# Patient Record
Sex: FEMALE | Race: WHITE | NOT HISPANIC OR LATINO | Employment: UNEMPLOYED | URBAN - METROPOLITAN AREA
[De-identification: names, ages, dates, MRNs, and addresses within clinical notes are randomized per-mention and may not be internally consistent; named-entity substitution may affect disease eponyms.]

---

## 2017-11-01 LAB — HEP C VIRUS AB: NEGATIVE

## 2019-02-07 ENCOUNTER — HOSPITAL ENCOUNTER (EMERGENCY)
Facility: HOSPITAL | Age: 39
Discharge: HOME | End: 2019-02-07
Attending: EMERGENCY MEDICINE
Payer: MEDICAID

## 2019-02-07 VITALS
WEIGHT: 293 LBS | RESPIRATION RATE: 22 BRPM | DIASTOLIC BLOOD PRESSURE: 63 MMHG | BODY MASS INDEX: 45.99 KG/M2 | OXYGEN SATURATION: 96 % | SYSTOLIC BLOOD PRESSURE: 189 MMHG | TEMPERATURE: 100 F | HEIGHT: 67 IN | HEART RATE: 113 BPM

## 2019-02-07 DIAGNOSIS — J11.1 INFLUENZA: Primary | ICD-10-CM

## 2019-02-07 LAB
BILIRUB UR QL STRIP: NEGATIVE
CLARITY UR: CLEAR
COLOR UR: YELLOW
GLUCOSE UR QL STRIP: NEGATIVE
HGB UR QL STRIP: NEGATIVE
INFLUENZA A, MOLECULAR: NEGATIVE
INFLUENZA B, MOLECULAR: NEGATIVE
KETONES UR QL STRIP: NEGATIVE
LEUKOCYTE ESTERASE UR QL STRIP: NEGATIVE
NITRITE UR QL STRIP: NEGATIVE
PH UR STRIP: 8 [PH] (ref 5–8)
PROT UR QL STRIP: ABNORMAL
SP GR UR STRIP: 1.02 (ref 1–1.03)
SPECIMEN SOURCE: NORMAL
URN SPEC COLLECT METH UR: ABNORMAL
UROBILINOGEN UR STRIP-ACNC: NEGATIVE EU/DL

## 2019-02-07 PROCEDURE — 99284 EMERGENCY DEPT VISIT MOD MDM: CPT

## 2019-02-07 PROCEDURE — 87502 INFLUENZA DNA AMP PROBE: CPT

## 2019-02-07 PROCEDURE — 81003 URINALYSIS AUTO W/O SCOPE: CPT

## 2019-02-07 RX ORDER — VENLAFAXINE 37.5 MG/1
TABLET ORAL 2 TIMES DAILY
COMMUNITY

## 2019-02-07 RX ORDER — LISINOPRIL 10 MG/1
10 TABLET ORAL DAILY
COMMUNITY

## 2019-02-07 RX ORDER — OSELTAMIVIR PHOSPHATE 75 MG/1
75 CAPSULE ORAL 2 TIMES DAILY
Qty: 10 CAPSULE | Refills: 0 | Status: SHIPPED | OUTPATIENT
Start: 2019-02-07 | End: 2019-02-12

## 2019-02-07 RX ORDER — LEVOTHYROXINE SODIUM 100 UG/1
100 TABLET ORAL DAILY
COMMUNITY

## 2019-02-07 RX ORDER — FUROSEMIDE 20 MG/1
20 TABLET ORAL 2 TIMES DAILY
COMMUNITY

## 2019-02-07 RX ORDER — PREDNISONE 10 MG/1
40 TABLET ORAL DAILY
Qty: 20 TABLET | Refills: 0 | Status: SHIPPED | OUTPATIENT
Start: 2019-02-07 | End: 2019-02-12

## 2019-02-07 RX ORDER — BUDESONIDE AND FORMOTEROL FUMARATE DIHYDRATE 160; 4.5 UG/1; UG/1
2 AEROSOL RESPIRATORY (INHALATION) EVERY 12 HOURS
COMMUNITY

## 2019-02-07 NOTE — ED TRIAGE NOTES
PT REPORTS FEVER, CHILLS, BODY ACHES X2 DAYS, HAS BEEN TAKING TYLENOL FOR FEVER, LAST TAKEN ABOUT 1900 LAST NIGHT. OTHER MEMBERS OF HOUSEHOLD HAVE BEEN SICK AS WELL. PT STATES SHE IS ABOUT 6 MONTHS PREGNANT AT THIS TIME. GILMA LARA

## 2019-02-07 NOTE — DISCHARGE INSTRUCTIONS
Tamiflu 75 mg twice daily for 5 days  Prednisone 40 mg daily for 5 days  Keep OB appointment scheduled day as discussed  Review CDC  information re FLU and Pregnancy

## 2019-02-09 NOTE — ED PROVIDER NOTES
Encounter Date: 2/7/2019       History     Chief Complaint   Patient presents with    Fever    Chills    Cough    Nausea     38 y F with reported pregnancy (pending OB eval today) complains of flu like illness with known exposure in past days     No symptoms pertaining to pregnancy     Stable medical conditions inc asthma           Review of patient's allergies indicates:   Allergen Reactions    Aspirin     Benadryl [diphenhydramine hcl]     Clindamycin     Contrast media      Past Medical History:   Diagnosis Date    Asthma     Diabetes mellitus     Hypertension     Thyroid disease      History reviewed. No pertinent surgical history.  History reviewed. No pertinent family history.  Social History     Tobacco Use    Smoking status: Current Every Day Smoker     Packs/day: 1.00     Types: Cigarettes    Smokeless tobacco: Never Used   Substance Use Topics    Alcohol use: No     Frequency: Never    Drug use: No     Review of Systems   Constitutional: Positive for chills, fatigue and fever. Negative for diaphoresis.   HENT: Positive for congestion and rhinorrhea. Negative for sore throat.    Eyes: Negative for discharge and visual disturbance.   Respiratory: Positive for cough. Negative for choking, chest tightness, shortness of breath, wheezing and stridor.    Cardiovascular: Negative.    Gastrointestinal: Negative.    Genitourinary: Negative for decreased urine volume, difficulty urinating, dysuria, flank pain, hematuria, pelvic pain, vaginal bleeding and vaginal discharge.   Musculoskeletal: Positive for arthralgias and myalgias.   Skin: Negative.    Hematological: Negative.    All other systems reviewed and are negative.      Physical Exam     Initial Vitals   BP Pulse Resp Temp SpO2   02/07/19 0150 02/07/19 0150 02/07/19 0150 02/07/19 0136 02/07/19 0150   (!) 205/87 (!) 114 (!) 22 100 °F (37.8 °C) (!) 92 %      MAP       --                Physical Exam    Nursing note and vitals  reviewed.  Constitutional: She appears well-developed and well-nourished. She is not diaphoretic. No distress.   5-7  158 kg F - no acute distress    HENT:   Head: Normocephalic and atraumatic.   Nose: Nose normal.   Mouth/Throat: Oropharynx is clear and moist and mucous membranes are normal. No oropharyngeal exudate, posterior oropharyngeal edema or posterior oropharyngeal erythema.   Eyes: Conjunctivae and EOM are normal. Pupils are equal, round, and reactive to light. Right eye exhibits no discharge. Left eye exhibits no discharge. No scleral icterus.   Neck: Normal range of motion. Neck supple.   Cardiovascular: Regular rhythm, normal heart sounds and intact distal pulses. Tachycardia present.  Exam reveals no gallop and no friction rub.    No murmur heard.  Pulmonary/Chest: Breath sounds normal. No accessory muscle usage. No tachypnea. No respiratory distress. She has no decreased breath sounds. She has no wheezes. She has no rhonchi. She has no rales.   Abdominal: Soft. Bowel sounds are normal. She exhibits no distension and no mass. There is no tenderness. There is no rebound and no guarding.   Musculoskeletal: Normal range of motion. She exhibits no edema or tenderness.   Lymphadenopathy:     She has no cervical adenopathy.   Neurological: She is alert and oriented to person, place, and time. She has normal strength. No cranial nerve deficit or sensory deficit. GCS score is 15. GCS eye subscore is 4. GCS verbal subscore is 5. GCS motor subscore is 6.   Skin: Skin is warm and dry. Capillary refill takes less than 2 seconds. No rash noted. No erythema. No pallor.   Psychiatric: She has a normal mood and affect. Her behavior is normal. Judgment and thought content normal.         ED Course   Procedures  Labs Reviewed   URINALYSIS, REFLEX TO URINE CULTURE - Abnormal; Notable for the following components:       Result Value    Protein, UA Trace (*)     All other components within normal limits    Narrative:      Preferred Collection Type->Urine, Clean Catch   INFLUENZA A & B BY MOLECULAR         Results for orders placed or performed during the hospital encounter of 02/07/19   Influenza A & B by Molecular   Result Value Ref Range    Influenza A, Molecular Negative Negative    Influenza B, Molecular Negative Negative    Flu A & B Source Nasal Swab    Urinalysis, Reflex to Urine Culture Urine, Clean Catch   Result Value Ref Range    Specimen UA Urine, Clean Catch     Color, UA Yellow Yellow, Straw, Bruna    Appearance, UA Clear Clear    pH, UA 8.0 5.0 - 8.0    Specific Gravity, UA 1.020 1.005 - 1.030    Protein, UA Trace (A) Negative    Glucose, UA Negative Negative    Ketones, UA Negative Negative    Bilirubin (UA) Negative Negative    Occult Blood UA Negative Negative    Nitrite, UA Negative Negative    Urobilinogen, UA Negative Negative EU/dL    Leukocytes, UA Negative Negative         Imaging Results    None          Medical Decision Making:   Clinical Tests:   Lab Tests: Ordered and Reviewed  ED Management:  Influenza likely given exposure   Underlying pulm issues  Reported Pregnancy  No acute complication of Flu A  Discussed importance of compliance with treatment     Tamiflu 75 mg twice daily for 5 days  Prednisone 40 mg daily for 5 days  Keep OB appointment scheduled day as discussed  Review CDC  information re FLU and Pregnancy                       Clinical Impression:   The encounter diagnosis was Influenza.                             Car Disla MD  02/09/19 0901

## 2020-07-13 ENCOUNTER — HOSPITAL ENCOUNTER (EMERGENCY)
Facility: HOSPITAL | Age: 40
Discharge: HOME | End: 2020-07-13
Attending: FAMILY MEDICINE
Payer: MEDICAID

## 2020-07-13 VITALS
TEMPERATURE: 99 F | SYSTOLIC BLOOD PRESSURE: 131 MMHG | OXYGEN SATURATION: 100 % | BODY MASS INDEX: 47.09 KG/M2 | HEART RATE: 90 BPM | DIASTOLIC BLOOD PRESSURE: 58 MMHG | RESPIRATION RATE: 22 BRPM | WEIGHT: 293 LBS | HEIGHT: 66 IN

## 2020-07-13 DIAGNOSIS — M79.604 PAIN OF RIGHT LOWER EXTREMITY: Primary | ICD-10-CM

## 2020-07-13 DIAGNOSIS — E66.01 CLASS 3 SEVERE OBESITY WITH BODY MASS INDEX (BMI) GREATER THAN OR EQUAL TO 70 IN ADULT, UNSPECIFIED OBESITY TYPE, UNSPECIFIED WHETHER SERIOUS COMORBIDITY PRESENT: ICD-10-CM

## 2020-07-13 LAB
D DIMER PPP IA.FEU-MCNC: 0.22 MG/L FEU
SARS-COV-2 RDRP RESP QL NAA+PROBE: NEGATIVE

## 2020-07-13 PROCEDURE — U0002 COVID-19 LAB TEST NON-CDC: HCPCS

## 2020-07-13 PROCEDURE — 99282 EMERGENCY DEPT VISIT SF MDM: CPT | Mod: 25

## 2020-07-13 PROCEDURE — 94640 AIRWAY INHALATION TREATMENT: CPT

## 2020-07-13 PROCEDURE — 94761 N-INVAS EAR/PLS OXIMETRY MLT: CPT

## 2020-07-13 PROCEDURE — 85379 FIBRIN DEGRADATION QUANT: CPT

## 2020-07-13 RX ORDER — IPRATROPIUM BROMIDE AND ALBUTEROL SULFATE 2.5; .5 MG/3ML; MG/3ML
3 SOLUTION RESPIRATORY (INHALATION)
Status: DISCONTINUED | OUTPATIENT
Start: 2020-07-13 | End: 2020-07-13 | Stop reason: HOSPADM

## 2020-07-13 NOTE — ED PROVIDER NOTES
Encounter Date: 7/13/2020       History     Chief Complaint   Patient presents with    Abdominal Pain    Leg Pain     40-year-old female presents to the ED complaining of feeling pain and swelling in the right leg and right thigh she has had problems with the right knee in the past currently she is in an adverse social situation is living in her car after apparently being evicted from local home we just moved here from [place] patient denies any hemoptysis or shortness of breath but does smoke 1/2 pack per day she has history of asthma and diabetes and is open to obtaining primary care locally she has no known coronary artery disease        Review of patient's allergies indicates:   Allergen Reactions    Aspirin     Benadryl [diphenhydramine hcl]     Clindamycin     Contrast media      Past Medical History:   Diagnosis Date    Asthma     Diabetes mellitus     Hypertension     Thyroid disease      No past surgical history on file.  No family history on file.  Social History     Tobacco Use    Smoking status: Current Every Day Smoker     Packs/day: 1.00     Types: Cigarettes    Smokeless tobacco: Never Used   Substance Use Topics    Alcohol use: No     Frequency: Never    Drug use: No     Review of Systems   Constitutional: Negative for fever.   HENT: Negative for congestion and sore throat.    Respiratory: Positive for cough. Negative for apnea, chest tightness and shortness of breath.    Cardiovascular: Positive for chest pain.   Gastrointestinal: Negative for nausea.   Genitourinary: Negative for dysuria.   Musculoskeletal: Positive for gait problem and joint swelling. Negative for back pain, myalgias and neck pain.   Skin: Negative for rash.   Neurological: Negative for weakness and numbness.   Hematological: Does not bruise/bleed easily.       Physical Exam     Initial Vitals [07/13/20 0020]   BP Pulse Resp Temp SpO2   (!) 150/84 103 18 98.9 °F (37.2 °C) (!) 94 %      MAP       --          Physical Exam    Nursing note and vitals reviewed.  Constitutional: She appears well-developed and well-nourished. She is not diaphoretic. No distress.   Patient is morbidly obese   HENT:   Head: Normocephalic and atraumatic.   Right Ear: External ear normal.   Left Ear: External ear normal.   Eyes: Pupils are equal, round, and reactive to light. Right eye exhibits no discharge. Left eye exhibits no discharge.   Neck: No tracheal deviation present. No JVD present.   Cardiovascular: Exam reveals no friction rub.    No murmur heard.  Pulmonary/Chest: No stridor. No respiratory distress. She has no wheezes. She has rales.   Abdominal: Bowel sounds are normal. She exhibits no distension.   Musculoskeletal: Normal range of motion.      Comments: Both left and right legs are somewhat erythematous without polo induration the right calf seems slightly enlarged however there is no Homans sign no evidence of polo cellulitis or joint effusion there is some ligamentous laxity in the right knee no tenderness in the right thigh her hip   Neurological: She is alert.   Skin: Skin is warm.   Psychiatric: She has a normal mood and affect.         ED Course   Procedures  Labs Reviewed   D DIMER, QUANTITATIVE   SARS-COV-2 RNA AMPLIFICATION, QUAL     EKG Readings: (Independently Interpreted)   Initial Reading: No STEMI. Rhythm: Normal Sinus Rhythm. Heart Rate: 94. Ectopy: No Ectopy. Conduction: Normal. ST Segments: Normal ST Segments. T Waves Flipped: V6.       Imaging Results    None                                          Clinical Impression:       ICD-10-CM ICD-9-CM   1. Pain of right lower extremity  M79.604 729.5   2. Class 3 severe obesity with body mass index (BMI) greater than or equal to 70 in adult, unspecified obesity type, unspecified whether serious comorbidity present  E66.01 278.01    Z68.45 V85.45                                Dmitri Barbosa MD  07/13/20 0220

## 2020-07-13 NOTE — ED TRIAGE NOTES
PT REPORTS PAIN IN RIGHT LEG AND RIGHT ABD PROGRESSIVELY WORSENING.  GOING ON FOR 3-4 DAYS.  RIGHT CALF HAS 5-6 MORE CM CIRCUMFERENCE.

## 2020-08-15 ENCOUNTER — HOSPITAL ENCOUNTER (OUTPATIENT)
Facility: HOSPITAL | Age: 40
Discharge: HOME | End: 2020-08-18
Attending: INTERNAL MEDICINE | Admitting: INTERNAL MEDICINE
Payer: MEDICAID

## 2020-08-15 DIAGNOSIS — N30.00 ACUTE CYSTITIS WITHOUT HEMATURIA: ICD-10-CM

## 2020-08-15 DIAGNOSIS — L02.31 ABSCESS OF BUTTOCK, RIGHT: Primary | ICD-10-CM

## 2020-08-15 DIAGNOSIS — Z48.89 ENCOUNTER FOR POSTOPERATIVE CARE: ICD-10-CM

## 2020-08-15 DIAGNOSIS — Z01.810 PREOP CARDIOVASCULAR EXAM: ICD-10-CM

## 2020-08-15 PROCEDURE — 99285 EMERGENCY DEPT VISIT HI MDM: CPT | Mod: 25

## 2020-08-16 ENCOUNTER — ANESTHESIA EVENT (OUTPATIENT)
Dept: SURGERY | Facility: HOSPITAL | Age: 40
End: 2020-08-16
Payer: MEDICAID

## 2020-08-16 ENCOUNTER — ANESTHESIA (OUTPATIENT)
Dept: SURGERY | Facility: HOSPITAL | Age: 40
End: 2020-08-16
Payer: MEDICAID

## 2020-08-16 PROBLEM — Z01.810 PREOP CARDIOVASCULAR EXAM: Status: ACTIVE | Noted: 2020-08-16

## 2020-08-16 PROBLEM — E66.01 MORBID OBESITY: Status: ACTIVE | Noted: 2020-08-16

## 2020-08-16 PROBLEM — L02.31 ABSCESS OF BUTTOCK, RIGHT: Status: ACTIVE | Noted: 2020-08-16

## 2020-08-16 PROBLEM — Z79.4 TYPE 2 DIABETES MELLITUS WITHOUT COMPLICATION, WITH LONG-TERM CURRENT USE OF INSULIN: Status: ACTIVE | Noted: 2020-08-16

## 2020-08-16 PROBLEM — E11.9 TYPE 2 DIABETES MELLITUS WITHOUT COMPLICATION, WITH LONG-TERM CURRENT USE OF INSULIN: Status: ACTIVE | Noted: 2020-08-16

## 2020-08-16 PROBLEM — I10 ESSENTIAL HYPERTENSION: Status: ACTIVE | Noted: 2020-08-16

## 2020-08-16 LAB
ALBUMIN SERPL BCP-MCNC: 3 G/DL (ref 3.5–5.2)
ALBUMIN SERPL BCP-MCNC: 3.3 G/DL (ref 3.5–5.2)
ALP SERPL-CCNC: 107 U/L (ref 55–135)
ALP SERPL-CCNC: 87 U/L (ref 55–135)
ALT SERPL W/O P-5'-P-CCNC: 20 U/L (ref 10–44)
ALT SERPL W/O P-5'-P-CCNC: 21 U/L (ref 10–44)
ANION GAP SERPL CALC-SCNC: 13 MMOL/L (ref 8–16)
ANION GAP SERPL CALC-SCNC: 14 MMOL/L (ref 8–16)
AST SERPL-CCNC: 20 U/L (ref 10–40)
AST SERPL-CCNC: 21 U/L (ref 10–40)
B-HCG UR QL: NEGATIVE
BACTERIA #/AREA URNS HPF: ABNORMAL /HPF
BASOPHILS # BLD AUTO: 0.08 K/UL (ref 0–0.2)
BASOPHILS # BLD AUTO: 0.1 K/UL (ref 0–0.2)
BASOPHILS NFR BLD: 0.7 % (ref 0–1.9)
BASOPHILS NFR BLD: 0.9 % (ref 0–1.9)
BILIRUB SERPL-MCNC: 0.5 MG/DL (ref 0.1–1)
BILIRUB SERPL-MCNC: 0.9 MG/DL (ref 0.1–1)
BILIRUB UR QL STRIP: NEGATIVE
BUN SERPL-MCNC: 10 MG/DL (ref 6–20)
BUN SERPL-MCNC: 9 MG/DL (ref 6–20)
CALCIUM SERPL-MCNC: 8.8 MG/DL (ref 8.7–10.5)
CALCIUM SERPL-MCNC: 8.9 MG/DL (ref 8.7–10.5)
CHLORIDE SERPL-SCNC: 95 MMOL/L (ref 95–110)
CHLORIDE SERPL-SCNC: 96 MMOL/L (ref 95–110)
CLARITY UR: CLEAR
CO2 SERPL-SCNC: 23 MMOL/L (ref 23–29)
CO2 SERPL-SCNC: 24 MMOL/L (ref 23–29)
COLOR UR: ABNORMAL
CREAT SERPL-MCNC: 0.7 MG/DL (ref 0.5–1.4)
CREAT SERPL-MCNC: 0.7 MG/DL (ref 0.5–1.4)
DIFFERENTIAL METHOD: ABNORMAL
DIFFERENTIAL METHOD: ABNORMAL
EOSINOPHIL # BLD AUTO: 0.3 K/UL (ref 0–0.5)
EOSINOPHIL # BLD AUTO: 0.4 K/UL (ref 0–0.5)
EOSINOPHIL NFR BLD: 2.7 % (ref 0–8)
EOSINOPHIL NFR BLD: 2.9 % (ref 0–8)
ERYTHROCYTE [DISTWIDTH] IN BLOOD BY AUTOMATED COUNT: 13.8 % (ref 11.5–14.5)
ERYTHROCYTE [DISTWIDTH] IN BLOOD BY AUTOMATED COUNT: 14.1 % (ref 11.5–14.5)
EST. GFR  (AFRICAN AMERICAN): >60 ML/MIN/1.73 M^2
EST. GFR  (AFRICAN AMERICAN): >60 ML/MIN/1.73 M^2
EST. GFR  (NON AFRICAN AMERICAN): >60 ML/MIN/1.73 M^2
EST. GFR  (NON AFRICAN AMERICAN): >60 ML/MIN/1.73 M^2
GLUCOSE SERPL-MCNC: 301 MG/DL (ref 70–110)
GLUCOSE SERPL-MCNC: 332 MG/DL (ref 70–110)
GLUCOSE UR QL STRIP: ABNORMAL
HCT VFR BLD AUTO: 41.3 % (ref 37–48.5)
HCT VFR BLD AUTO: 42.9 % (ref 37–48.5)
HGB BLD-MCNC: 12.6 G/DL (ref 12–16)
HGB BLD-MCNC: 13.4 G/DL (ref 12–16)
HGB UR QL STRIP: ABNORMAL
IMM GRANULOCYTES # BLD AUTO: 0.08 K/UL (ref 0–0.04)
IMM GRANULOCYTES # BLD AUTO: 0.09 K/UL (ref 0–0.04)
IMM GRANULOCYTES NFR BLD AUTO: 0.7 % (ref 0–0.5)
IMM GRANULOCYTES NFR BLD AUTO: 0.8 % (ref 0–0.5)
INR PPP: 1 (ref 0.8–1.2)
KETONES UR QL STRIP: ABNORMAL
LACTATE SERPL-SCNC: 1.9 MMOL/L (ref 0.5–2.2)
LEUKOCYTE ESTERASE UR QL STRIP: NEGATIVE
LYMPHOCYTES # BLD AUTO: 2.3 K/UL (ref 1–4.8)
LYMPHOCYTES # BLD AUTO: 2.6 K/UL (ref 1–4.8)
LYMPHOCYTES NFR BLD: 19.8 % (ref 18–48)
LYMPHOCYTES NFR BLD: 21.3 % (ref 18–48)
MAGNESIUM SERPL-MCNC: 1.9 MG/DL (ref 1.6–2.6)
MCH RBC QN AUTO: 26.7 PG (ref 27–31)
MCH RBC QN AUTO: 27 PG (ref 27–31)
MCHC RBC AUTO-ENTMCNC: 30.5 G/DL (ref 32–36)
MCHC RBC AUTO-ENTMCNC: 31.2 G/DL (ref 32–36)
MCV RBC AUTO: 87 FL (ref 82–98)
MCV RBC AUTO: 88 FL (ref 82–98)
MICROSCOPIC COMMENT: ABNORMAL
MONOCYTES # BLD AUTO: 0.9 K/UL (ref 0.3–1)
MONOCYTES # BLD AUTO: 1 K/UL (ref 0.3–1)
MONOCYTES NFR BLD: 8.1 % (ref 4–15)
MONOCYTES NFR BLD: 8.2 % (ref 4–15)
NEUTROPHILS # BLD AUTO: 7.9 K/UL (ref 1.8–7.7)
NEUTROPHILS # BLD AUTO: 8.1 K/UL (ref 1.8–7.7)
NEUTROPHILS NFR BLD: 66.2 % (ref 38–73)
NEUTROPHILS NFR BLD: 67.7 % (ref 38–73)
NITRITE UR QL STRIP: POSITIVE
NRBC BLD-RTO: 0 /100 WBC
NRBC BLD-RTO: 0 /100 WBC
PH UR STRIP: 6 [PH] (ref 5–8)
PHOSPHATE SERPL-MCNC: 3.4 MG/DL (ref 2.7–4.5)
PLATELET # BLD AUTO: 300 K/UL (ref 150–350)
PLATELET # BLD AUTO: 320 K/UL (ref 150–350)
PMV BLD AUTO: 11.1 FL (ref 9.2–12.9)
PMV BLD AUTO: 11.8 FL (ref 9.2–12.9)
POCT GLUCOSE: 258 MG/DL (ref 70–110)
POCT GLUCOSE: 295 MG/DL (ref 70–110)
POTASSIUM SERPL-SCNC: 4.1 MMOL/L (ref 3.5–5.1)
POTASSIUM SERPL-SCNC: 4.1 MMOL/L (ref 3.5–5.1)
PROT SERPL-MCNC: 7.2 G/DL (ref 6–8.4)
PROT SERPL-MCNC: 7.8 G/DL (ref 6–8.4)
PROT UR QL STRIP: ABNORMAL
PROTHROMBIN TIME: 10.3 SEC (ref 9–12.5)
RBC # BLD AUTO: 4.72 M/UL (ref 4–5.4)
RBC # BLD AUTO: 4.96 M/UL (ref 4–5.4)
RBC #/AREA URNS HPF: 6 /HPF (ref 0–4)
SARS-COV-2 RDRP RESP QL NAA+PROBE: NEGATIVE
SODIUM SERPL-SCNC: 132 MMOL/L (ref 136–145)
SODIUM SERPL-SCNC: 133 MMOL/L (ref 136–145)
SP GR UR STRIP: 1.02 (ref 1–1.03)
SQUAMOUS #/AREA URNS HPF: 8 /HPF
URN SPEC COLLECT METH UR: ABNORMAL
UROBILINOGEN UR STRIP-ACNC: NEGATIVE EU/DL
WBC # BLD AUTO: 11.64 K/UL (ref 3.9–12.7)
WBC # BLD AUTO: 12.21 K/UL (ref 3.9–12.7)
WBC #/AREA URNS HPF: >100 /HPF (ref 0–5)
YEAST URNS QL MICRO: ABNORMAL

## 2020-08-16 PROCEDURE — 00300 ANES ALL PX INTEG H/N/PTRUNK: CPT | Performed by: SURGERY

## 2020-08-16 PROCEDURE — 87088 URINE BACTERIA CULTURE: CPT

## 2020-08-16 PROCEDURE — G0378 HOSPITAL OBSERVATION PER HR: HCPCS

## 2020-08-16 PROCEDURE — 81000 URINALYSIS NONAUTO W/SCOPE: CPT

## 2020-08-16 PROCEDURE — 87186 SC STD MICRODIL/AGAR DIL: CPT | Mod: 59

## 2020-08-16 PROCEDURE — 99219 PR INITIAL OBSERVATION CARE,LEVL II: CPT | Mod: ,,, | Performed by: FAMILY MEDICINE

## 2020-08-16 PROCEDURE — 27000221 HC OXYGEN, UP TO 24 HOURS

## 2020-08-16 PROCEDURE — 81025 URINE PREGNANCY TEST: CPT

## 2020-08-16 PROCEDURE — U0002 COVID-19 LAB TEST NON-CDC: HCPCS

## 2020-08-16 PROCEDURE — 85025 COMPLETE CBC W/AUTO DIFF WBC: CPT

## 2020-08-16 PROCEDURE — 63600175 PHARM REV CODE 636 W HCPCS: Performed by: SURGERY

## 2020-08-16 PROCEDURE — 25000003 PHARM REV CODE 250: Performed by: SURGERY

## 2020-08-16 PROCEDURE — 96372 THER/PROPH/DIAG INJ SC/IM: CPT | Mod: 59

## 2020-08-16 PROCEDURE — 99219 PR INITIAL OBSERVATION CARE,LEVL II: ICD-10-PCS | Mod: ,,, | Performed by: FAMILY MEDICINE

## 2020-08-16 PROCEDURE — 37000009 HC ANESTHESIA EA ADD 15 MINS: Performed by: SURGERY

## 2020-08-16 PROCEDURE — 87070 CULTURE OTHR SPECIMN AEROBIC: CPT | Mod: 59

## 2020-08-16 PROCEDURE — 96361 HYDRATE IV INFUSION ADD-ON: CPT

## 2020-08-16 PROCEDURE — 88304 TISSUE EXAM BY PATHOLOGIST: CPT | Performed by: PATHOLOGY

## 2020-08-16 PROCEDURE — 25000242 PHARM REV CODE 250 ALT 637 W/ HCPCS: Performed by: NURSE ANESTHETIST, CERTIFIED REGISTERED

## 2020-08-16 PROCEDURE — C9399 UNCLASSIFIED DRUGS OR BIOLOG: HCPCS | Performed by: HOSPITALIST

## 2020-08-16 PROCEDURE — D9220A PRA ANESTHESIA: Mod: CRNA,,, | Performed by: NURSE ANESTHETIST, CERTIFIED REGISTERED

## 2020-08-16 PROCEDURE — 36000706: Performed by: SURGERY

## 2020-08-16 PROCEDURE — D9220A PRA ANESTHESIA: Mod: ANES,,, | Performed by: ANESTHESIOLOGY

## 2020-08-16 PROCEDURE — D9220A PRA ANESTHESIA: ICD-10-PCS | Mod: CRNA,,, | Performed by: NURSE ANESTHETIST, CERTIFIED REGISTERED

## 2020-08-16 PROCEDURE — 88304 PR  SURG PATH,LEVEL III: ICD-10-PCS | Mod: 26,,, | Performed by: PATHOLOGY

## 2020-08-16 PROCEDURE — 63600175 PHARM REV CODE 636 W HCPCS: Performed by: HOSPITALIST

## 2020-08-16 PROCEDURE — 87040 BLOOD CULTURE FOR BACTERIA: CPT | Mod: 59

## 2020-08-16 PROCEDURE — 25000003 PHARM REV CODE 250: Performed by: NURSE ANESTHETIST, CERTIFIED REGISTERED

## 2020-08-16 PROCEDURE — 87086 URINE CULTURE/COLONY COUNT: CPT

## 2020-08-16 PROCEDURE — 25000003 PHARM REV CODE 250

## 2020-08-16 PROCEDURE — 87077 CULTURE AEROBIC IDENTIFY: CPT | Mod: 59

## 2020-08-16 PROCEDURE — 88304 TISSUE EXAM BY PATHOLOGIST: CPT | Mod: 26,,, | Performed by: PATHOLOGY

## 2020-08-16 PROCEDURE — 63600175 PHARM REV CODE 636 W HCPCS: Performed by: NURSE ANESTHETIST, CERTIFIED REGISTERED

## 2020-08-16 PROCEDURE — 36000707: Performed by: SURGERY

## 2020-08-16 PROCEDURE — 93010 ELECTROCARDIOGRAM REPORT: CPT | Mod: ,,, | Performed by: INTERNAL MEDICINE

## 2020-08-16 PROCEDURE — 93005 ELECTROCARDIOGRAM TRACING: CPT | Mod: 59

## 2020-08-16 PROCEDURE — 99205 OFFICE O/P NEW HI 60 MIN: CPT | Mod: 25,,, | Performed by: SURGERY

## 2020-08-16 PROCEDURE — 87075 CULTR BACTERIA EXCEPT BLOOD: CPT

## 2020-08-16 PROCEDURE — 99205 PR OFFICE/OUTPT VISIT, NEW, LEVL V, 60-74 MIN: ICD-10-PCS | Mod: 25,,, | Performed by: SURGERY

## 2020-08-16 PROCEDURE — 63600175 PHARM REV CODE 636 W HCPCS

## 2020-08-16 PROCEDURE — 96376 TX/PRO/DX INJ SAME DRUG ADON: CPT

## 2020-08-16 PROCEDURE — C9399 UNCLASSIFIED DRUGS OR BIOLOG: HCPCS | Performed by: SURGERY

## 2020-08-16 PROCEDURE — D9220A PRA ANESTHESIA: ICD-10-PCS | Mod: ANES,,, | Performed by: ANESTHESIOLOGY

## 2020-08-16 PROCEDURE — 71000033 HC RECOVERY, INTIAL HOUR: Performed by: SURGERY

## 2020-08-16 PROCEDURE — 25000003 PHARM REV CODE 250: Performed by: FAMILY MEDICINE

## 2020-08-16 PROCEDURE — 83605 ASSAY OF LACTIC ACID: CPT

## 2020-08-16 PROCEDURE — 87040 BLOOD CULTURE FOR BACTERIA: CPT

## 2020-08-16 PROCEDURE — 85610 PROTHROMBIN TIME: CPT

## 2020-08-16 PROCEDURE — 80053 COMPREHEN METABOLIC PANEL: CPT | Mod: 91

## 2020-08-16 PROCEDURE — 10061 I&D ABSCESS COMP/MULTIPLE: CPT | Mod: ,,, | Performed by: SURGERY

## 2020-08-16 PROCEDURE — 83735 ASSAY OF MAGNESIUM: CPT

## 2020-08-16 PROCEDURE — 36415 COLL VENOUS BLD VENIPUNCTURE: CPT

## 2020-08-16 PROCEDURE — 94761 N-INVAS EAR/PLS OXIMETRY MLT: CPT

## 2020-08-16 PROCEDURE — 37000008 HC ANESTHESIA 1ST 15 MINUTES: Performed by: SURGERY

## 2020-08-16 PROCEDURE — 96374 THER/PROPH/DIAG INJ IV PUSH: CPT | Mod: 59

## 2020-08-16 PROCEDURE — 84100 ASSAY OF PHOSPHORUS: CPT

## 2020-08-16 PROCEDURE — 80053 COMPREHEN METABOLIC PANEL: CPT

## 2020-08-16 PROCEDURE — 10061 PR DRAIN SKIN ABSCESS COMPLIC: ICD-10-PCS | Mod: ,,, | Performed by: SURGERY

## 2020-08-16 PROCEDURE — 93010 EKG 12-LEAD: ICD-10-PCS | Mod: ,,, | Performed by: INTERNAL MEDICINE

## 2020-08-16 PROCEDURE — 25000003 PHARM REV CODE 250: Performed by: HOSPITALIST

## 2020-08-16 RX ORDER — ALBUTEROL SULFATE 90 UG/1
AEROSOL, METERED RESPIRATORY (INHALATION)
Status: DISCONTINUED | OUTPATIENT
Start: 2020-08-16 | End: 2020-08-16

## 2020-08-16 RX ORDER — SUCCINYLCHOLINE CHLORIDE 20 MG/ML
INJECTION INTRAMUSCULAR; INTRAVENOUS
Status: DISCONTINUED | OUTPATIENT
Start: 2020-08-16 | End: 2020-08-16

## 2020-08-16 RX ORDER — MEPERIDINE HYDROCHLORIDE 50 MG/ML
INJECTION INTRAMUSCULAR; INTRAVENOUS; SUBCUTANEOUS
Status: DISCONTINUED | OUTPATIENT
Start: 2020-08-16 | End: 2020-08-16

## 2020-08-16 RX ORDER — FAMOTIDINE 10 MG/ML
20 INJECTION INTRAVENOUS ONCE
Status: DISCONTINUED | OUTPATIENT
Start: 2020-08-16 | End: 2020-08-18 | Stop reason: HOSPADM

## 2020-08-16 RX ORDER — IBUPROFEN 200 MG
24 TABLET ORAL
Status: DISCONTINUED | OUTPATIENT
Start: 2020-08-16 | End: 2020-08-18 | Stop reason: HOSPADM

## 2020-08-16 RX ORDER — VANCOMYCIN 2 GRAM/500 ML IN 0.9 % SODIUM CHLORIDE INTRAVENOUS
2000
Status: DISCONTINUED | OUTPATIENT
Start: 2020-08-16 | End: 2020-08-18 | Stop reason: HOSPADM

## 2020-08-16 RX ORDER — OXYCODONE HYDROCHLORIDE 5 MG/1
5 TABLET ORAL EVERY 6 HOURS PRN
Status: DISCONTINUED | OUTPATIENT
Start: 2020-08-16 | End: 2020-08-16

## 2020-08-16 RX ORDER — SODIUM CHLORIDE, SODIUM LACTATE, POTASSIUM CHLORIDE, CALCIUM CHLORIDE 600; 310; 30; 20 MG/100ML; MG/100ML; MG/100ML; MG/100ML
INJECTION, SOLUTION INTRAVENOUS CONTINUOUS PRN
Status: DISCONTINUED | OUTPATIENT
Start: 2020-08-16 | End: 2020-08-16

## 2020-08-16 RX ORDER — SODIUM CHLORIDE, SODIUM LACTATE, POTASSIUM CHLORIDE, CALCIUM CHLORIDE 600; 310; 30; 20 MG/100ML; MG/100ML; MG/100ML; MG/100ML
125 INJECTION, SOLUTION INTRAVENOUS CONTINUOUS
Status: DISCONTINUED | OUTPATIENT
Start: 2020-08-16 | End: 2020-08-16

## 2020-08-16 RX ORDER — OXYCODONE AND ACETAMINOPHEN 5; 325 MG/1; MG/1
1 TABLET ORAL
Status: DISCONTINUED | OUTPATIENT
Start: 2020-08-16 | End: 2020-08-18 | Stop reason: HOSPADM

## 2020-08-16 RX ORDER — IBUPROFEN 200 MG
16 TABLET ORAL
Status: DISCONTINUED | OUTPATIENT
Start: 2020-08-16 | End: 2020-08-18 | Stop reason: HOSPADM

## 2020-08-16 RX ORDER — LEVOTHYROXINE SODIUM 25 UG/1
100 TABLET ORAL
Status: DISCONTINUED | OUTPATIENT
Start: 2020-08-16 | End: 2020-08-18 | Stop reason: HOSPADM

## 2020-08-16 RX ORDER — FAMOTIDINE 10 MG/ML
INJECTION INTRAVENOUS
Status: DISPENSED
Start: 2020-08-16 | End: 2020-08-17

## 2020-08-16 RX ORDER — BUPIVACAINE HYDROCHLORIDE AND EPINEPHRINE 5; 5 MG/ML; UG/ML
INJECTION, SOLUTION EPIDURAL; INTRACAUDAL; PERINEURAL
Status: DISCONTINUED | OUTPATIENT
Start: 2020-08-16 | End: 2020-08-16 | Stop reason: HOSPADM

## 2020-08-16 RX ORDER — SODIUM CHLORIDE, SODIUM LACTATE, POTASSIUM CHLORIDE, CALCIUM CHLORIDE 600; 310; 30; 20 MG/100ML; MG/100ML; MG/100ML; MG/100ML
INJECTION, SOLUTION INTRAVENOUS CONTINUOUS
Status: DISCONTINUED | OUTPATIENT
Start: 2020-08-16 | End: 2020-08-16

## 2020-08-16 RX ORDER — EPINEPHRINE 0.3 MG/.3ML
INJECTION SUBCUTANEOUS
Status: DISPENSED
Start: 2020-08-16 | End: 2020-08-16

## 2020-08-16 RX ORDER — ACETAMINOPHEN 325 MG/1
650 TABLET ORAL EVERY 4 HOURS PRN
Status: DISCONTINUED | OUTPATIENT
Start: 2020-08-16 | End: 2020-08-18 | Stop reason: HOSPADM

## 2020-08-16 RX ORDER — PROPOFOL 10 MG/ML
VIAL (ML) INTRAVENOUS
Status: DISCONTINUED | OUTPATIENT
Start: 2020-08-16 | End: 2020-08-16

## 2020-08-16 RX ORDER — ONDANSETRON 2 MG/ML
4 INJECTION INTRAMUSCULAR; INTRAVENOUS EVERY 8 HOURS PRN
Status: DISCONTINUED | OUTPATIENT
Start: 2020-08-16 | End: 2020-08-18 | Stop reason: HOSPADM

## 2020-08-16 RX ORDER — SODIUM CHLORIDE 9 MG/ML
INJECTION, SOLUTION INTRAVENOUS CONTINUOUS
Status: DISCONTINUED | OUTPATIENT
Start: 2020-08-16 | End: 2020-08-17

## 2020-08-16 RX ORDER — INSULIN ASPART 100 [IU]/ML
INJECTION, SOLUTION INTRAVENOUS; SUBCUTANEOUS
COMMUNITY

## 2020-08-16 RX ORDER — ROCURONIUM BROMIDE 10 MG/ML
INJECTION, SOLUTION INTRAVENOUS
Status: DISCONTINUED | OUTPATIENT
Start: 2020-08-16 | End: 2020-08-16

## 2020-08-16 RX ORDER — ONDANSETRON 2 MG/ML
4 INJECTION INTRAMUSCULAR; INTRAVENOUS DAILY PRN
Status: DISCONTINUED | OUTPATIENT
Start: 2020-08-16 | End: 2020-08-16

## 2020-08-16 RX ORDER — VANCOMYCIN HCL IN 5 % DEXTROSE 1G/250ML
PLASTIC BAG, INJECTION (ML) INTRAVENOUS
Status: COMPLETED
Start: 2020-08-16 | End: 2020-08-16

## 2020-08-16 RX ORDER — HYDROXYZINE PAMOATE 25 MG/1
25 CAPSULE ORAL EVERY 8 HOURS PRN
Status: DISCONTINUED | OUTPATIENT
Start: 2020-08-16 | End: 2020-08-18 | Stop reason: HOSPADM

## 2020-08-16 RX ORDER — MORPHINE SULFATE 4 MG/ML
INJECTION, SOLUTION INTRAMUSCULAR; INTRAVENOUS
Status: COMPLETED
Start: 2020-08-16 | End: 2020-08-16

## 2020-08-16 RX ORDER — GLUCAGON 1 MG
1 KIT INJECTION
Status: DISCONTINUED | OUTPATIENT
Start: 2020-08-16 | End: 2020-08-18 | Stop reason: HOSPADM

## 2020-08-16 RX ORDER — IPRATROPIUM BROMIDE AND ALBUTEROL SULFATE 2.5; .5 MG/3ML; MG/3ML
3 SOLUTION RESPIRATORY (INHALATION) EVERY 4 HOURS PRN
Status: DISCONTINUED | OUTPATIENT
Start: 2020-08-16 | End: 2020-08-18 | Stop reason: HOSPADM

## 2020-08-16 RX ORDER — METHYLPREDNISOLONE SOD SUCC 125 MG
VIAL (EA) INJECTION
Status: DISPENSED
Start: 2020-08-16 | End: 2020-08-16

## 2020-08-16 RX ORDER — VENLAFAXINE 37.5 MG/1
37.5 TABLET ORAL 2 TIMES DAILY
Status: DISCONTINUED | OUTPATIENT
Start: 2020-08-16 | End: 2020-08-16

## 2020-08-16 RX ORDER — MORPHINE SULFATE 10 MG/ML
2 INJECTION INTRAMUSCULAR; INTRAVENOUS; SUBCUTANEOUS EVERY 4 HOURS PRN
Status: DISCONTINUED | OUTPATIENT
Start: 2020-08-16 | End: 2020-08-16 | Stop reason: RX

## 2020-08-16 RX ORDER — INSULIN ASPART 100 [IU]/ML
1-10 INJECTION, SOLUTION INTRAVENOUS; SUBCUTANEOUS
Status: DISCONTINUED | OUTPATIENT
Start: 2020-08-16 | End: 2020-08-18 | Stop reason: HOSPADM

## 2020-08-16 RX ORDER — HYDROXYZINE HYDROCHLORIDE 25 MG/1
TABLET, FILM COATED ORAL
Status: DISCONTINUED
Start: 2020-08-16 | End: 2020-08-16 | Stop reason: WASHOUT

## 2020-08-16 RX ORDER — LIDOCAINE HYDROCHLORIDE 10 MG/ML
1 INJECTION, SOLUTION EPIDURAL; INFILTRATION; INTRACAUDAL; PERINEURAL ONCE
Status: DISCONTINUED | OUTPATIENT
Start: 2020-08-16 | End: 2020-08-18 | Stop reason: HOSPADM

## 2020-08-16 RX ORDER — MORPHINE SULFATE 4 MG/ML
2 INJECTION, SOLUTION INTRAMUSCULAR; INTRAVENOUS EVERY 4 HOURS PRN
Status: DISCONTINUED | OUTPATIENT
Start: 2020-08-16 | End: 2020-08-18 | Stop reason: HOSPADM

## 2020-08-16 RX ORDER — SODIUM CHLORIDE 0.9 % (FLUSH) 0.9 %
10 SYRINGE (ML) INJECTION
Status: DISCONTINUED | OUTPATIENT
Start: 2020-08-16 | End: 2020-08-18 | Stop reason: HOSPADM

## 2020-08-16 RX ORDER — FLUTICASONE FUROATE AND VILANTEROL 100; 25 UG/1; UG/1
1 POWDER RESPIRATORY (INHALATION) DAILY
Status: DISCONTINUED | OUTPATIENT
Start: 2020-08-16 | End: 2020-08-18 | Stop reason: HOSPADM

## 2020-08-16 RX ORDER — MORPHINE SULFATE 4 MG/ML
2 INJECTION, SOLUTION INTRAMUSCULAR; INTRAVENOUS EVERY 5 MIN PRN
Status: DISCONTINUED | OUTPATIENT
Start: 2020-08-16 | End: 2020-08-16

## 2020-08-16 RX ORDER — LORAZEPAM 0.5 MG/1
0.5 TABLET ORAL EVERY 6 HOURS PRN
Status: DISCONTINUED | OUTPATIENT
Start: 2020-08-16 | End: 2020-08-17

## 2020-08-16 RX ORDER — LISINOPRIL 10 MG/1
10 TABLET ORAL DAILY
Status: DISCONTINUED | OUTPATIENT
Start: 2020-08-16 | End: 2020-08-18 | Stop reason: HOSPADM

## 2020-08-16 RX ORDER — MIDAZOLAM HYDROCHLORIDE 1 MG/ML
INJECTION, SOLUTION INTRAMUSCULAR; INTRAVENOUS
Status: DISCONTINUED | OUTPATIENT
Start: 2020-08-16 | End: 2020-08-16

## 2020-08-16 RX ORDER — METHYLPREDNISOLONE SOD SUCC 125 MG
125 VIAL (EA) INJECTION EVERY 6 HOURS
Status: DISCONTINUED | OUTPATIENT
Start: 2020-08-16 | End: 2020-08-18

## 2020-08-16 RX ADMIN — VANCOMYCIN HYDROCHLORIDE 2000 MG: 1 INJECTION, POWDER, LYOPHILIZED, FOR SOLUTION INTRAVENOUS at 09:08

## 2020-08-16 RX ADMIN — OXYCODONE HYDROCHLORIDE 5 MG: 5 TABLET ORAL at 06:08

## 2020-08-16 RX ADMIN — ALBUTEROL SULFATE 2 PUFF: 90 AEROSOL, METERED RESPIRATORY (INHALATION) at 01:08

## 2020-08-16 RX ADMIN — LEVOTHYROXINE SODIUM 100 MCG: 25 TABLET ORAL at 06:08

## 2020-08-16 RX ADMIN — INSULIN DETEMIR 15 UNITS: 100 INJECTION, SOLUTION SUBCUTANEOUS at 09:08

## 2020-08-16 RX ADMIN — SUCCINYLCHOLINE CHLORIDE 150 MG: 20 INJECTION, SOLUTION INTRAMUSCULAR; INTRAVENOUS at 12:08

## 2020-08-16 RX ADMIN — PROPOFOL 200 MG: 10 INJECTION, EMULSION INTRAVENOUS at 12:08

## 2020-08-16 RX ADMIN — LISINOPRIL 10 MG: 10 TABLET ORAL at 09:08

## 2020-08-16 RX ADMIN — VANCOMYCIN HYDROCHLORIDE 500 MG: 500 INJECTION, POWDER, LYOPHILIZED, FOR SOLUTION INTRAVENOUS at 04:08

## 2020-08-16 RX ADMIN — SODIUM CHLORIDE: 0.9 INJECTION, SOLUTION INTRAVENOUS at 02:08

## 2020-08-16 RX ADMIN — PIPERACILLIN AND TAZOBACTAM 4.5 G: 4; .5 INJECTION, POWDER, FOR SOLUTION INTRAVENOUS at 08:08

## 2020-08-16 RX ADMIN — ROCURONIUM BROMIDE 25 MG: 10 INJECTION, SOLUTION INTRAVENOUS at 12:08

## 2020-08-16 RX ADMIN — INSULIN ASPART 6 UNITS: 100 INJECTION, SOLUTION INTRAVENOUS; SUBCUTANEOUS at 06:08

## 2020-08-16 RX ADMIN — PIPERACILLIN AND TAZOBACTAM 4.5 G: 4; .5 INJECTION, POWDER, FOR SOLUTION INTRAVENOUS at 03:08

## 2020-08-16 RX ADMIN — SODIUM CHLORIDE, POTASSIUM CHLORIDE, SODIUM LACTATE AND CALCIUM CHLORIDE: 600; 310; 30; 20 INJECTION, SOLUTION INTRAVENOUS at 12:08

## 2020-08-16 RX ADMIN — MIDAZOLAM HYDROCHLORIDE 2 MG: 1 INJECTION, SOLUTION INTRAMUSCULAR; INTRAVENOUS at 12:08

## 2020-08-16 RX ADMIN — ONDANSETRON HYDROCHLORIDE 4 MG: 2 SOLUTION INTRAMUSCULAR; INTRAVENOUS at 12:08

## 2020-08-16 RX ADMIN — LORAZEPAM 0.5 MG: 0.5 TABLET ORAL at 09:08

## 2020-08-16 RX ADMIN — Medication 25 MG: at 01:08

## 2020-08-16 RX ADMIN — OXYCODONE HYDROCHLORIDE AND ACETAMINOPHEN 1 TABLET: 5; 325 TABLET ORAL at 09:08

## 2020-08-16 RX ADMIN — OXYCODONE HYDROCHLORIDE AND ACETAMINOPHEN 1 TABLET: 5; 325 TABLET ORAL at 05:08

## 2020-08-16 RX ADMIN — SUGAMMADEX 200 MG: 100 INJECTION, SOLUTION INTRAVENOUS at 01:08

## 2020-08-16 RX ADMIN — Medication 25 MG: at 12:08

## 2020-08-16 RX ADMIN — SODIUM CHLORIDE: 0.9 INJECTION, SOLUTION INTRAVENOUS at 03:08

## 2020-08-16 RX ADMIN — DEXTROSE MONOHYDRATE 1 G: 50 INJECTION, SOLUTION INTRAVENOUS at 05:08

## 2020-08-16 RX ADMIN — PIPERACILLIN AND TAZOBACTAM 4.5 G: 4; .5 INJECTION, POWDER, FOR SOLUTION INTRAVENOUS at 11:08

## 2020-08-16 NOTE — HPI
History of Present Illness:  Patient is a 40 y.o. female who has a past medical history of Asthma, Diabetes mellitus, Hypertension, and Thyroid disease presented with increasing pain and drainage from her right buttock abscess. Patient stated it started off small and she was able to pop it and express drainage from it. It improved some then it started bleeding and becoming bigger in size. She now presented to ED with abscess to her right buttock that is actively draining. ED unable to perform I&D at bedside. Work up showed WBC count of 12K and normal lactic acid. CT scan of pelvis is currently pending. Patient currently denies pain but is in tears because she is unable to see her .

## 2020-08-16 NOTE — OP NOTE
[facility] - Med Surg  General Surgery  Op Note  08/16/2020      Patient Name: Serenity Ba  MRN: [id number]         SURGEON:  Burak Cevallos M.D.     ASSISTANT: None     PREOPERATIVE DIAGNOSIS:  Right buttock abscess     POSTOPERATIVE DIAGNOSIS:  Same     SURGICAL PROCEDURE PERFORMED:  Incision and drainage of right buttock abscess.  Debridement of necrotic tissue.    ANESTHESIA:  General endotracheal.     INDICATIONS FOR PROCEDURE:  Large abscess medial right buttock    SURGICAL FINDINGS:  10 cm abscess with necrotic skin and subcutaneous tissue.  No tracking, loculations, extensions, etc.     PROCEDURE IN DETAIL:  In preop holding, Serenity Ba was identified by name, wrist band, and birth date.  She confirmed identity.  Informed consent process reviewed. She verbalized consent as well as understanding of all treatment options, risks, benefits, details of, and indications for each option.  Operative site was marked.  She confirmed the correct site was identified and marked.  Intravenous antibiotics administered.  Transported to operating room. Time-out completed. Transferred to OR bed. Anesthesia induced without difficulty or complications. Operative field prepped and draped in the usual sterile fashion with ChloraPrep, sterile towels, and sterile drapes.  ChloraPrep was permitted to dry for 3 min before placing towels and drapes.  0.5% Marcaine with epinephrine was infiltrated the operative field with a total of 30 cc was used.  Cruciate configured incision was made overlying the abscess with a 15 blade.  Hemopurulent exudate was encountered under pressure.  Exudate was cultured aerobically and anaerobically.  Abscess was examined digitally.  The incision was extended in all directions to reach the limits the cavity.  Wound was irrigated.  Necrotic skin and subcutaneous tissue was sharply debrided with the scalpel.  Area of debridement measured 4 x 4 cm.  Wound was explored.  No tracking,  loculations, extensions, residual exudate, residual necrotic tissue, etc noted.  Wound was irrigated.  Hemostasis was ensured.  Wound was packed with saline soaked gauze.  Sterile dressing applied.  Emerged from her anesthetic without difficulty and transported to the recovery room in good condition.    TOLERATED: Well    COUNTS:  Correct  on multiple occasions    DRAINS: None     ESTIMATED BLOOD LOSS:  Less than 5 cc     SPECIMEN:  Cultures.  Necrotic tissue.     COMPLICATIONS: None     DISPOSITION:  To PACU, stable.    RECOMMENDATIONS/PLAN:  Daily packing changes with saline soaked gauze as outlined in postoperative orders until wound heals by secondary intention.  Follow-up cultures when available.  Adjust antibiotics if warranted.  Further recommendations and management will depend upon clinical course.      Documented with M*Modal voice recognition software.      Burak Cevallos MD FACS

## 2020-08-16 NOTE — NURSING
Patient called out and upon entry into room, noticed that patient had a BM in the bed.  Patient was assisted into bathroom,  at bedside.  Linens changed and soiled dressing to right buttock removed and new dry dressing of gauze, ABD pads, and perforated tape applied.  Packing was still in place and not visibly soiled.  Patient was then assisted to couch, currently eating dinner.  NAD noted.

## 2020-08-16 NOTE — ASSESSMENT & PLAN NOTE
Lab Results   Component Value Date    WBC 11.64 08/16/2020     CT scan pending  Lactic acid normal   Start IV Vanc and Zosyn  Draw blood and wound cultures  Consult general surgery    08/16/2020  CT scan showing abscess  General surgery has taken patient to the OR for I&D of abscess today.  Appreciate their recommendations in this case  Will continue IV antibiotics  Follow-up cultures  Monitor daily CBC and BMP  Monitor for fever

## 2020-08-16 NOTE — H&P
[facility] - ED Hospital Medicine  History & Physical    Patient Name: Serenity Ba  MRN: [id number]  Admission Date: 8/15/2020  Attending Physician: Jack Hill MD   Primary Care Provider: Primary Doctor No         Patient information was obtained from patient and ER records.       Subjective:     Principal Problem:Abscess of buttock, right    Chief Complaint:   Chief Complaint   Patient presents with    Abscess        HPI: History of Present Illness:  Patient is a 40 y.o. female who has a past medical history of Asthma, Diabetes mellitus, Hypertension, and Thyroid disease presented with increasing pain and drainage from her right buttock abscess. Patient stated it started off small and she was able to pop it and express drainage from it. It improved some then it started bleeding and becoming bigger in size. She now presented to ED with abscess to her right buttock that is actively draining. ED unable to perform I&D at bedside. Work up showed WBC count of 12K and normal lactic acid. CT scan of pelvis is currently pending. Patient currently denies pain but is in tears because she is unable to see her .     Past Medical History:   Diagnosis Date    Asthma     Diabetes mellitus     Hypertension     Thyroid disease        History reviewed. No pertinent surgical history.    Review of patient's allergies indicates:   Allergen Reactions    Aspirin     Benadryl [diphenhydramine hcl]     Clindamycin     Contrast media        No current facility-administered medications on file prior to encounter.      Current Outpatient Medications on File Prior to Encounter   Medication Sig    budesonide-formoterol 160-4.5 mcg (SYMBICORT) 160-4.5 mcg/actuation HFAA Inhale 2 puffs into the lungs every 12 (twelve) hours. Controller    furosemide (LASIX) 20 MG tablet Take 20 mg by mouth 2 (two) times daily.    insulin aspart U-100 (NOVOLOG) 100 unit/mL injection Inject into the skin 3 (three)  times daily before meals.    levothyroxine (SYNTHROID) 100 MCG tablet Take 100 mcg by mouth once daily.    lisinopril 10 MG tablet Take 10 mg by mouth once daily.    venlafaxine (EFFEXOR) 37.5 MG Tab Take by mouth 2 (two) times daily.     Family History     None        Tobacco Use    Smoking status: Current Every Day Smoker     Packs/day: 1.00     Types: Cigarettes    Smokeless tobacco: Never Used   Substance and Sexual Activity    Alcohol use: No     Frequency: Never    Drug use: No    Sexual activity: Not on file     Review of Systems   Constitutional: Negative for activity change, appetite change, chills, fatigue and fever.   HENT: Negative.    Respiratory: Negative.    Cardiovascular: Negative.    Gastrointestinal: Positive for abdominal distention.   Genitourinary: Negative.    Skin: Positive for wound.   Neurological: Negative.    Hematological: Negative.    Psychiatric/Behavioral: Negative.      Objective:     Vital Signs (Most Recent):  Temp: 98.2 °F (36.8 °C) (08/16/20 0005)  Pulse: 94 (08/16/20 0005)  Resp: 18 (08/16/20 0005)  BP: (!) 131/48 (08/16/20 0005)  SpO2: (!) 94 % (08/16/20 0005) Vital Signs (24h Range):  Temp:  [98.2 °F (36.8 °C)] 98.2 °F (36.8 °C)  Pulse:  [94] 94  Resp:  [18] 18  SpO2:  [94 %] 94 %  BP: (131)/(48) 131/48     Weight: (!) 193.2 kg (426 lb)  Body mass index is 68.76 kg/m².    Physical Exam  Constitutional:       General: She is not in acute distress.     Appearance: Normal appearance. She is morbidly obese. She is not ill-appearing.   HENT:      Head: Normocephalic and atraumatic.   Cardiovascular:      Rate and Rhythm: Normal rate.   Pulmonary:      Effort: No respiratory distress.   Skin:     Findings: Abscess present.             Comments: + erythema and induration of right buttock   Dressing in place with serosanguineous drainage    Neurological:      General: No focal deficit present.      Mental Status: She is alert and oriented to person, place, and time.    Psychiatric:         Mood and Affect: Mood normal.         Behavior: Behavior normal.         Thought Content: Thought content normal.         Judgment: Judgment normal.             Significant Labs: All pertinent labs within the past 24 hours have been reviewed.    Significant Imaging: I have reviewed all pertinent imaging results/findings within the past 24 hours.    Assessment/Plan:     * Abscess of buttock, right  Lab Results   Component Value Date    WBC 12.21 08/16/2020     CT scan pending  Lactic acid normal   Start IV Vanc and Zosyn  Draw blood and wound cultures  Consult general surgery    Essential hypertension  BP Readings from Last 3 Encounters:   08/16/20 (!) 159/76   07/13/20 (!) 131/58   02/07/19 (!) 189/63     Restart home BP meds    Type 2 diabetes mellitus without complication, with long-term current use of insulin  Patient takes 25U 70/30 TID at home  Will convert to 15U Detemir BID  Hold prandial due to NPO  Low dose correction scale   Cont blood glucose monitoring   ADA diet      VTE Risk Mitigation (From admission, onward)         Ordered     Place ABI hose  Until discontinued      08/16/20 0125     IP VTE HIGH RISK PATIENT  Once      08/16/20 0125     Place sequential compression device  Until discontinued      08/16/20 0125                 The attending portion of this evaluation, treatment, and documentation was performed per Td Melissa MD via audiovisual.      Td Melissa MD  Department of Hospital Medicine   [facility] - ED

## 2020-08-16 NOTE — NURSING
"Pt complaining of severe itching. MD notified, and epi-pen, solumedrol, and hydroxyzine ordered stat. Before administering meds, pt stated, "Wait, I wasn't really itching or having an allergic reaction. I was just trying to see if that would help let my  stay with me overnight." Pt denies SOB, redness, or hives, and refuses to take any of the ordered medications. Will continue to monitor.  "
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
## 2020-08-16 NOTE — CONSULTS
[facility] - Med Surg  General Surgery  Consult Note  08/16/2020    Patient Name: Serenity Ba  MRN: [id number]  Admission Date: 8/15/2020        REASON FOR CONSULT:  Buttock abscess    HISTORY:  Ms. Ba presented the emergency room early this morning with a worsening right buttock abscess.  She had a small pustule in the medial right buttock that she popped approximately 1 week ago.  Since that time she has noted increasing redness, swelling, drainage, and pain from the site.  No fever or chills.  No nausea or vomiting.  No other associated symptoms.  She now has intolerable pain with copious foul-smelling purulent drainage.    PAST MEDICAL HISTORY:  Asthma.  Hypertension.  Hypothyroidism.  Diabetes.  Morbid obesity.    ALLERGIES:  Aspirin.  Benadryl.  Clindamycin.  Iodine containing contrast.    HOME MEDICATIONS:  Symbicort.  Lasix.  Insulin.  Synthroid.  Lisinopril.  Effexor.    HOSPITAL MEDICATIONS:  Symbicort.  Insulin.  Levothyroxine.  Lisinopril.  Methylprednisolone.  Zosyn.  Vancomycin.    SOCIAL HISTORY:  Active smoker.  Twenty-five pack-year history.  Nondrinker.  No history of alcohol dependence or withdrawal.  No history any illegal or recreational drug use.  No history of alcohol or drug treatment.    FAMILY HISTORY:  Noncontributory.  No family history of any anesthetic complications, bleeding disorders, immune deficiencies, autoimmune disorders    ROS:  As above otherwise all 14 systems entirely negative    Physical Exam:   Gen.-normotensive, normocardic, comfortable, no acute distress, GCS 15.  BMI 69  HEENT-normocephalic, atraumatic, sclerae are anicteric, pupils equal round reactive to light, conjunctiva clear, nares patent without discharge, dentition fair, no oral lesions, oropharynx clear without exudates, mucous membranes moist.   Neck-nontender, full range of motion, no JVD, no bruits, no masses, no thyromegaly.   Cardiovascular-regular rate and rhythm, PMI nondisplaced,  tones normal, no gallops, no rubs, no murmurs.  Intact distal pulses throughout.  No peripheral edema.  No bruits or thrills.  Good perfusion.   Pulmonary-clear to auscultation, no respiratory distress, no wheezes, no rales, no rhonchi, good full symmetrical excursion, no accessory muscle use, no retractions.   Abdomen-soft, nontender, nondistended, normoactive normopitched bowel sounds, no masses, no ventral hernias, no inguinal hernias, no bruits, no hepatosplenomegaly, no guarding, no rebound.  Negative heel jolt, psoas, obturator, and Quiñones signs.  No percussion, referred, or CVA tenderness.  -normal external genitalia, no lesions, no discharge, no masses.  Rectal-no masses, normal sphincter tone, nontender, Hemoccult negative.    Extremities/Musculoskeletal-no clubbing, no cyanosis, no edema, no gross deformities.  Integument-no rashes, no lesions, no jaundice, no induration, no decubiti.  Large indurated, erythematous, and fluctuant mass in the medial right buttock near the intergluteal crease.  Exam limited by body habitus and discomfort    Lymphatic-no adenopathy - cervical, supraclavicular, axillary, or inguinal.    Psych-mental status intact, no hallucinations, no suicidal ideations, no homicidal ideations, alert, appropriate, oriented to person, time, and place.  Neuro-no gross cranial nerve deficits, no gross motor deficits, no evident sensory deficits, no incoordination, no tremors    LAB RESULTS:  No leukocytosis, anemia, or thrombocytopenia.  PT/INR shows no suggestion of anticoagulation therapy oral coagulopathy.  Mild hyponatremia.  All other electrolytes are in the range of normal.  BUN and creatinine good.  Hyperglycemia, moderate.  No evidence of hepatocellular disease or biliary obstruction.  Pregnancy test negative.  COVID-19 rapid screening negative.  No evidence of lactic acidosis.  Urinalysis suggestive of UTI, culture pending.  Blood and wound cultures pending.    RADIOLOGY RESULTS:  CT  scan of the pelvis without contrast films and report reviewed.  Study was limited by obesity.  Inflammatory process is present in the medial right buttock consistent with a phlegmon or abscess.  Process appears limited to the subcutaneous tissue of the buttock and does not appear to involve the muscle, anus, rectum, vulvar, etc.    CARDIOVASCULAR STUDIES:  EKG reviewed from today.  Twelve lead tracing showed normal sinus rhythm with no evidence of any ischemic changes.    ASSESSMENT:  Large right buttock abscess.  Comorbid issues including diabetes, obesity, hypothyroidism, hypertension, asthma, and probable Pickwickian syndrome.    MEDICAL DECISION MAKING:  Surgical drainage of the abscess and debridement of necrotic tissue warranted.  Options at this time include were not limited to transfer to tertiary care center, second opinion, medical management with antibiotics.    COUNSELING:  Ms. Ba was counseled regarding the results of her evaluation thus far.  Recommendations for surgical drainage and debridement in the operating room under general anesthetic were expressed and explained.  Risks of surgery discussed included not limited to death, bleeding, infections, scars, chronic pain, nerve damage, recurrent abscess, need for additional operations or procedures.  Risks of declining surgery discussed included were not limited to sepsis, death, endocarditis, necrotizing fasciitis, organ failure, etc.  Details of recommended procedure discussed included were not limited to general anesthetic, cruciate configured incision overlying the abscess, debridement of necrotic tissue, packing wound open.  Postoperative care would involve daily packing changes until the wound heals by secondary intention.  Questions were solicited and answered.  Entire conversation was held in layman's terms.  At the conclusion of the conversation she voiced understanding of all we discussed, satisfaction all questions were answered, and  stated that she felt fully informed completely capable of making an informed decision.  She desires to proceed with surgical drainage and debridement of the abscess in the operating room under general anesthetic today.    RECOMMENDATIONS:  Continue intravenous antibiotics and current care.  Control diabetes.  Abscess I&D today.  Follow-up cultures when available.  Adjust antibiotics if warranted. Further recommendations will depend upon clinical course.      PLAN:  Proceed with I and D today.  Postoperative daily wound care with packing changes until wound heals by secondary intention.  Further management will depend upon clinical course.    Total face-to-face encounter time was 60 minutes, 40 minutes spent counseling as outlined/summarized above.        Burak Cevallos MD  8/16/2020

## 2020-08-16 NOTE — TRANSFER OF CARE
"Anesthesia Transfer of Care Note    Patient: Serenity Ba    Procedure(s) Performed: Procedure(s) (LRB):  INCISION AND DRAINAGE, ABSCESS (Right)    Patient location: PACU    Anesthesia Type: general    Transport from OR: Transported from OR on room air with adequate spontaneous ventilation    Post pain: adequate analgesia    Post assessment: no apparent anesthetic complications    Post vital signs: stable    Level of consciousness: sedated and responds to stimulation    Nausea/Vomiting: no nausea/vomiting    Complications: none    Transfer of care protocol was followed      Last vitals:   Visit Vitals  /63 (BP Location: Left arm, Patient Position: Sitting)   Pulse 87   Temp 36.3 °C (97.3 °F) (Oral)   Resp 18   Ht 5' 6" (1.676 m)   Wt (!) 193.2 kg (426 lb)   LMP 07/16/2020   SpO2 95%   Breastfeeding No   BMI 68.76 kg/m²     "
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
## 2020-08-16 NOTE — SUBJECTIVE & OBJECTIVE
Past Medical History:   Diagnosis Date    Asthma     Diabetes mellitus     Hypertension     Thyroid disease        History reviewed. No pertinent surgical history.    Review of patient's allergies indicates:   Allergen Reactions    Aspirin     Benadryl [diphenhydramine hcl]     Clindamycin     Contrast media        No current facility-administered medications on file prior to encounter.      Current Outpatient Medications on File Prior to Encounter   Medication Sig    budesonide-formoterol 160-4.5 mcg (SYMBICORT) 160-4.5 mcg/actuation HFAA Inhale 2 puffs into the lungs every 12 (twelve) hours. Controller    furosemide (LASIX) 20 MG tablet Take 20 mg by mouth 2 (two) times daily.    insulin aspart U-100 (NOVOLOG) 100 unit/mL injection Inject into the skin 3 (three) times daily before meals.    levothyroxine (SYNTHROID) 100 MCG tablet Take 100 mcg by mouth once daily.    lisinopril 10 MG tablet Take 10 mg by mouth once daily.    venlafaxine (EFFEXOR) 37.5 MG Tab Take by mouth 2 (two) times daily.     Family History     None        Tobacco Use    Smoking status: Current Every Day Smoker     Packs/day: 1.00     Types: Cigarettes    Smokeless tobacco: Never Used   Substance and Sexual Activity    Alcohol use: No     Frequency: Never    Drug use: No    Sexual activity: Not on file     Review of Systems   Constitutional: Negative for activity change, appetite change, chills, fatigue and fever.   HENT: Negative.    Respiratory: Negative.    Cardiovascular: Negative.    Gastrointestinal: Positive for abdominal distention.   Genitourinary: Negative.    Skin: Positive for wound.   Neurological: Negative.    Hematological: Negative.    Psychiatric/Behavioral: Negative.      Objective:     Vital Signs (Most Recent):  Temp: 98.2 °F (36.8 °C) (08/16/20 0005)  Pulse: 94 (08/16/20 0005)  Resp: 18 (08/16/20 0005)  BP: (!) 131/48 (08/16/20 0005)  SpO2: (!) 94 % (08/16/20 0005) Vital Signs (24h Range):  Temp:  [98.2  °F (36.8 °C)] 98.2 °F (36.8 °C)  Pulse:  [94] 94  Resp:  [18] 18  SpO2:  [94 %] 94 %  BP: (131)/(48) 131/48     Weight: (!) 193.2 kg (426 lb)  Body mass index is 68.76 kg/m².    Physical Exam  Constitutional:       General: She is not in acute distress.     Appearance: Normal appearance. She is morbidly obese. She is not ill-appearing.   HENT:      Head: Normocephalic and atraumatic.   Cardiovascular:      Rate and Rhythm: Normal rate.   Pulmonary:      Effort: No respiratory distress.   Skin:     Findings: Abscess present.             Comments: + erythema and induration of right buttock   Dressing in place with serosanguineous drainage    Neurological:      General: No focal deficit present.      Mental Status: She is alert and oriented to person, place, and time.   Psychiatric:         Mood and Affect: Mood normal.         Behavior: Behavior normal.         Thought Content: Thought content normal.         Judgment: Judgment normal.             Significant Labs: All pertinent labs within the past 24 hours have been reviewed.    Significant Imaging: I have reviewed all pertinent imaging results/findings within the past 24 hours.

## 2020-08-16 NOTE — PLAN OF CARE
Problem:  Fall Injury Risk  Goal: Absence of Fall, Infant Drop and Related Injury  Outcome: Ongoing, Progressing     Problem: Adult Inpatient Plan of Care  Goal: Plan of Care Review  Outcome: Ongoing, Progressing  Goal: Patient-Specific Goal (Individualization)  Outcome: Ongoing, Progressing  Goal: Absence of Hospital-Acquired Illness or Injury  Outcome: Ongoing, Progressing  Goal: Optimal Comfort and Wellbeing  Outcome: Ongoing, Progressing  Goal: Readiness for Transition of Care  Outcome: Ongoing, Progressing  Goal: Rounds/Family Conference  Outcome: Ongoing, Progressing     Problem: Bariatric Environmental Safety  Goal: Safety Maintained with Care  Outcome: Ongoing, Progressing

## 2020-08-16 NOTE — ANESTHESIA POSTPROCEDURE EVALUATION
Anesthesia Post Evaluation    Patient: Serenity Ba    Procedure(s) Performed: Procedure(s) (LRB):  INCISION AND DRAINAGE, ABSCESS (Right)    Final Anesthesia Type: general    Patient location during evaluation: PACU  Patient participation: Yes- Able to Participate  Level of consciousness: awake and alert  Post-procedure vital signs: reviewed and stable  Pain management: adequate  Airway patency: patent    PONV status at discharge: No PONV  Anesthetic complications: no      Cardiovascular status: blood pressure returned to baseline  Respiratory status: unassisted  Hydration status: euvolemic  Follow-up not needed.          Vitals Value Taken Time   /64 08/16/20 1419   Temp 35.8 °C (96.5 °F) 08/16/20 1419   Pulse 83 08/16/20 1419   Resp 18 08/16/20 1419   SpO2 94 % 08/16/20 1419         Event Time   Out of Recovery 08/16/2020 14:21:08         Pain/Mabel Score: Pain Rating Prior to Med Admin: 3 (8/16/2020  9:56 AM)  Mabel Score: 9 (8/16/2020  2:17 PM)

## 2020-08-16 NOTE — HOSPITAL COURSE
08/16/2020  Patient admitted and started on IV antibiotics.  General surgery evaluated the patient and recommended I&D of the buttock abscess.  Patient also with anxiety and panic attacks so started on Ativan 0.5 mg p.r.n. as well as Percocet p.r.n. for pain.  Patient has not been able to take her Effexor in over a month and was only able to get it infrequently prior to this so will not continue it.  Will need to discuss alternative medications.    08/17/2020  Continue IV antibiotics.  Patient is now s/p I&D of buttock abscess.  Ativan increase to 1 mg to help with anxiety which is working.  Pain is well controlled.  Patient doing well overall.  Anticipate discharge once cultures have resulted.

## 2020-08-16 NOTE — ASSESSMENT & PLAN NOTE
Patient takes 25U 70/30 TID at home  Will convert to 15U Detemir BID  Hold prandial due to NPO  Low dose correction scale   Cont blood glucose monitoring   ADA diet

## 2020-08-16 NOTE — PLAN OF CARE
PATIENT AWAKE AND ALERT. ASKING AND ANSWERING QUESTIONS. ON 1L OF OXYGEN. IV INTACT AND INFUSING. VSS. CRNA ASSESSED PATIENT AND WAS OKAY RELEASING PATIENT TO THE FLOOR. REPORT CALLED. PATIENT TAKEN TO THE FLOOR VIA HOSPITAL BED. NURSE AT BEDSIDE.

## 2020-08-16 NOTE — ASSESSMENT & PLAN NOTE
BP Readings from Last 3 Encounters:   08/16/20 (!) 159/76   07/13/20 (!) 131/58   02/07/19 (!) 189/63     Restart home BP meds

## 2020-08-16 NOTE — ED PROVIDER NOTES
Encounter Date: 8/15/2020       History     Chief Complaint   Patient presents with    Abscess     HPI  Review of patient's allergies indicates:   Allergen Reactions    Aspirin     Benadryl [diphenhydramine hcl]     Clindamycin     Contrast media      Past Medical History:   Diagnosis Date    Asthma     Diabetes mellitus     Hypertension     Thyroid disease      History reviewed. No pertinent surgical history.  History reviewed. No pertinent family history.  Social History     Tobacco Use    Smoking status: Current Every Day Smoker     Packs/day: 1.00     Types: Cigarettes    Smokeless tobacco: Never Used   Substance Use Topics    Alcohol use: No     Frequency: Never    Drug use: No     Review of Systems    Physical Exam     Initial Vitals [08/16/20 0005]   BP Pulse Resp Temp SpO2   (!) 131/48 94 18 98.2 °F (36.8 °C) (!) 94 %      MAP       --         Physical Exam    ED Course   Procedures  Labs Reviewed   CBC W/ AUTO DIFFERENTIAL - Abnormal; Notable for the following components:       Result Value    Mean Corpuscular Hemoglobin Conc 31.2 (*)     Immature Granulocytes 0.7 (*)     Gran # (ANC) 8.1 (*)     Immature Grans (Abs) 0.08 (*)     All other components within normal limits   COMPREHENSIVE METABOLIC PANEL - Abnormal; Notable for the following components:    Sodium 132 (*)     Glucose 332 (*)     Albumin 3.3 (*)     All other components within normal limits    Narrative:     Recoll. [id number] by [facility] at 08/16/2020 01:08, reason: Specimen   hemolyzed   URINALYSIS, REFLEX TO URINE CULTURE - Abnormal; Notable for the following components:    Protein, UA Trace (*)     Glucose, UA 3+ (*)     Ketones, UA Trace (*)     Occult Blood UA Trace (*)     Nitrite, UA Positive (*)     All other components within normal limits    Narrative:     Specimen Source->Urine   URINALYSIS MICROSCOPIC - Abnormal; Notable for the following components:    RBC, UA 6 (*)     WBC, UA >100 (*)     Bacteria Many (*)     All other  components within normal limits    Narrative:     Specimen Source->Urine   CULTURE, BLOOD   CULTURE, URINE   CULTURE, BLOOD   CULTURE, BLOOD   SARS-COV-2 RNA AMPLIFICATION, QUAL   PREGNANCY TEST, URINE RAPID    Narrative:     Specimen Source->Urine   LACTIC ACID, PLASMA   COMPREHENSIVE METABOLIC PANEL   MAGNESIUM   PHOSPHORUS   CBC W/ AUTO DIFFERENTIAL          Imaging Results          CT Pelvis Without Contrast (In process)    Procedure changed from CT Pelvis With Contrast                                                  Clinical Impression:       ICD-10-CM ICD-9-CM   1. Preop cardiovascular exam  Z01.810 V72.81             ED Disposition Condition    Observation                           Jack Hill MD  08/16/20 0508

## 2020-08-16 NOTE — ANESTHESIA PREPROCEDURE EVALUATION
08/16/2020  Serenity Ba is a 40 y.o., female.    Anesthesia Evaluation    I have reviewed the Patient Summary Reports.    I have reviewed the Nursing Notes. I have reviewed the NPO Status.   I have reviewed the Medications.     Review of Systems  Social:  Smoker    Hematology/Oncology:  Hematology Normal   Oncology Normal     EENT/Dental:EENT/Dental Normal   Cardiovascular:   Hypertension    Pulmonary:   COPD Asthma    Renal/:  Renal/ Normal     Hepatic/GI:  Hepatic/GI Normal    Musculoskeletal:  Musculoskeletal Normal    Neurological:  Neurology Normal    Endocrine:   Diabetes    Dermatological:  Skin Normal    Psych:  Psychiatric Normal           Physical Exam  General:  Well nourished, Obesity, Morbid Obesity    Airway/Jaw/Neck:  Airway Findings: Mouth Opening: Small, but > 3cm Tongue: Normal  Pre-Existing Airway Tube(s): Oral Endotracheal tube  General Airway Assessment: Adult  Mallampati: III  Improves to II with phonation.  TM Distance: Normal, at least 6 cm  Jaw/Neck Findings:  Neck ROM: Normal ROM       Chest/Lungs:  Chest/Lungs Findings: Clear to auscultation     Heart/Vascular:  Heart Findings: Rate: Normal  Rhythm: Regular Rhythm        Mental Status:  Mental Status Findings:  Cooperative, Alert and Oriented         Anesthesia Plan  Type of Anesthesia, risks & benefits discussed:  Anesthesia Type:  general  Patient's Preference:   Intra-op Monitoring Plan: standard ASA monitors  Intra-op Monitoring Plan Comments:   Post Op Pain Control Plan: IV/PO Opioids PRN  Post Op Pain Control Plan Comments:   Induction:   IV  Beta Blocker:  Patient is not currently on a Beta-Blocker (No further documentation required).       Informed Consent: Patient understands risks and agrees with Anesthesia plan.  Questions answered. Anesthesia consent signed with patient.  ASA Score: 4  emergent   Day of Surgery  Review of History & Physical: I have interviewed and examined the patient. I have reviewed the patient's H&P dated:            Ready For Surgery From Anesthesia Perspective.

## 2020-08-16 NOTE — NURSING
1218: Patient to surgery via bed.    1415: Patient back to room from surgery via bed,  at bedside.  VS taken and WNL, on room air, respirations even and unlabored.  Bed alarm placed and call light within reach.  Patient instructed to call when needing to get up, verbalized understanding.

## 2020-08-16 NOTE — ASSESSMENT & PLAN NOTE
Lab Results   Component Value Date    WBC 12.21 08/16/2020     CT scan pending  Lactic acid normal   Start IV Vanc and Zosyn  Draw blood and wound cultures  Consult general surgery

## 2020-08-17 LAB
ALBUMIN SERPL BCP-MCNC: 2.8 G/DL (ref 3.5–5.2)
ALP SERPL-CCNC: 70 U/L (ref 55–135)
ALT SERPL W/O P-5'-P-CCNC: 20 U/L (ref 10–44)
ANION GAP SERPL CALC-SCNC: 9 MMOL/L (ref 8–16)
AST SERPL-CCNC: 18 U/L (ref 10–40)
BASOPHILS # BLD AUTO: 0.11 K/UL (ref 0–0.2)
BASOPHILS NFR BLD: 1 % (ref 0–1.9)
BILIRUB SERPL-MCNC: 0.6 MG/DL (ref 0.1–1)
BUN SERPL-MCNC: 10 MG/DL (ref 6–20)
CALCIUM SERPL-MCNC: 8.3 MG/DL (ref 8.7–10.5)
CHLORIDE SERPL-SCNC: 98 MMOL/L (ref 95–110)
CO2 SERPL-SCNC: 26 MMOL/L (ref 23–29)
CREAT SERPL-MCNC: 0.8 MG/DL (ref 0.5–1.4)
DIFFERENTIAL METHOD: ABNORMAL
EOSINOPHIL # BLD AUTO: 0.3 K/UL (ref 0–0.5)
EOSINOPHIL NFR BLD: 2.3 % (ref 0–8)
ERYTHROCYTE [DISTWIDTH] IN BLOOD BY AUTOMATED COUNT: 13.9 % (ref 11.5–14.5)
EST. GFR  (AFRICAN AMERICAN): >60 ML/MIN/1.73 M^2
EST. GFR  (NON AFRICAN AMERICAN): >60 ML/MIN/1.73 M^2
GLUCOSE SERPL-MCNC: 220 MG/DL (ref 70–110)
HCT VFR BLD AUTO: 39.4 % (ref 37–48.5)
HGB BLD-MCNC: 12 G/DL (ref 12–16)
IMM GRANULOCYTES # BLD AUTO: 0.06 K/UL (ref 0–0.04)
IMM GRANULOCYTES NFR BLD AUTO: 0.5 % (ref 0–0.5)
LYMPHOCYTES # BLD AUTO: 2.3 K/UL (ref 1–4.8)
LYMPHOCYTES NFR BLD: 20.7 % (ref 18–48)
MAGNESIUM SERPL-MCNC: 1.9 MG/DL (ref 1.6–2.6)
MCH RBC QN AUTO: 26.7 PG (ref 27–31)
MCHC RBC AUTO-ENTMCNC: 30.5 G/DL (ref 32–36)
MCV RBC AUTO: 88 FL (ref 82–98)
MONOCYTES # BLD AUTO: 1 K/UL (ref 0.3–1)
MONOCYTES NFR BLD: 8.9 % (ref 4–15)
NEUTROPHILS # BLD AUTO: 7.5 K/UL (ref 1.8–7.7)
NEUTROPHILS NFR BLD: 66.6 % (ref 38–73)
NRBC BLD-RTO: 0 /100 WBC
PHOSPHATE SERPL-MCNC: 3.5 MG/DL (ref 2.7–4.5)
PLATELET # BLD AUTO: 272 K/UL (ref 150–350)
PMV BLD AUTO: 11.1 FL (ref 9.2–12.9)
POCT GLUCOSE: 219 MG/DL (ref 70–110)
POCT GLUCOSE: 241 MG/DL (ref 70–110)
POCT GLUCOSE: 318 MG/DL (ref 70–110)
POCT GLUCOSE: 371 MG/DL (ref 70–110)
POCT GLUCOSE: 376 MG/DL (ref 70–110)
POTASSIUM SERPL-SCNC: 4.3 MMOL/L (ref 3.5–5.1)
PROT SERPL-MCNC: 6.5 G/DL (ref 6–8.4)
RBC # BLD AUTO: 4.5 M/UL (ref 4–5.4)
SODIUM SERPL-SCNC: 133 MMOL/L (ref 136–145)
VANCOMYCIN TROUGH SERPL-MCNC: 10.5 UG/ML (ref 10–22)
WBC # BLD AUTO: 11.28 K/UL (ref 3.9–12.7)

## 2020-08-17 PROCEDURE — 83735 ASSAY OF MAGNESIUM: CPT

## 2020-08-17 PROCEDURE — 99024 PR POST-OP FOLLOW-UP VISIT: ICD-10-PCS | Mod: ,,, | Performed by: SURGERY

## 2020-08-17 PROCEDURE — 96367 TX/PROPH/DG ADDL SEQ IV INF: CPT

## 2020-08-17 PROCEDURE — 63600175 PHARM REV CODE 636 W HCPCS: Performed by: SURGERY

## 2020-08-17 PROCEDURE — 96372 THER/PROPH/DIAG INJ SC/IM: CPT | Mod: 59

## 2020-08-17 PROCEDURE — 96374 THER/PROPH/DIAG INJ IV PUSH: CPT | Mod: 59

## 2020-08-17 PROCEDURE — 94761 N-INVAS EAR/PLS OXIMETRY MLT: CPT

## 2020-08-17 PROCEDURE — 99225 PR SUBSEQUENT OBSERVATION CARE,LEVEL II: ICD-10-PCS | Mod: ,,, | Performed by: FAMILY MEDICINE

## 2020-08-17 PROCEDURE — 85025 COMPLETE CBC W/AUTO DIFF WBC: CPT

## 2020-08-17 PROCEDURE — 99225 PR SUBSEQUENT OBSERVATION CARE,LEVEL II: CPT | Mod: ,,, | Performed by: FAMILY MEDICINE

## 2020-08-17 PROCEDURE — 25000003 PHARM REV CODE 250: Performed by: SURGERY

## 2020-08-17 PROCEDURE — 80202 ASSAY OF VANCOMYCIN: CPT

## 2020-08-17 PROCEDURE — 80053 COMPREHEN METABOLIC PANEL: CPT

## 2020-08-17 PROCEDURE — 96376 TX/PRO/DX INJ SAME DRUG ADON: CPT

## 2020-08-17 PROCEDURE — G0378 HOSPITAL OBSERVATION PER HR: HCPCS

## 2020-08-17 PROCEDURE — 36415 COLL VENOUS BLD VENIPUNCTURE: CPT

## 2020-08-17 PROCEDURE — 99024 POSTOP FOLLOW-UP VISIT: CPT | Mod: ,,, | Performed by: SURGERY

## 2020-08-17 PROCEDURE — 96366 THER/PROPH/DIAG IV INF ADDON: CPT

## 2020-08-17 PROCEDURE — 84100 ASSAY OF PHOSPHORUS: CPT

## 2020-08-17 PROCEDURE — 96365 THER/PROPH/DIAG IV INF INIT: CPT

## 2020-08-17 RX ORDER — LORAZEPAM 1 MG/1
1 TABLET ORAL EVERY 6 HOURS PRN
Status: DISCONTINUED | OUTPATIENT
Start: 2020-08-17 | End: 2020-08-18 | Stop reason: HOSPADM

## 2020-08-17 RX ADMIN — VANCOMYCIN HYDROCHLORIDE 2000 MG: 1 INJECTION, POWDER, LYOPHILIZED, FOR SOLUTION INTRAVENOUS at 07:08

## 2020-08-17 RX ADMIN — LORAZEPAM 0.5 MG: 0.5 TABLET ORAL at 12:08

## 2020-08-17 RX ADMIN — PIPERACILLIN AND TAZOBACTAM 4.5 G: 4; .5 INJECTION, POWDER, FOR SOLUTION INTRAVENOUS at 07:08

## 2020-08-17 RX ADMIN — INSULIN ASPART 4 UNITS: 100 INJECTION, SOLUTION INTRAVENOUS; SUBCUTANEOUS at 08:08

## 2020-08-17 RX ADMIN — METHYLPREDNISOLONE SODIUM SUCCINATE 125 MG: 125 INJECTION, POWDER, FOR SOLUTION INTRAMUSCULAR; INTRAVENOUS at 11:08

## 2020-08-17 RX ADMIN — METHYLPREDNISOLONE SODIUM SUCCINATE 125 MG: 125 INJECTION, POWDER, FOR SOLUTION INTRAMUSCULAR; INTRAVENOUS at 06:08

## 2020-08-17 RX ADMIN — LEVOTHYROXINE SODIUM 100 MCG: 25 TABLET ORAL at 06:08

## 2020-08-17 RX ADMIN — OXYCODONE HYDROCHLORIDE AND ACETAMINOPHEN 1 TABLET: 5; 325 TABLET ORAL at 02:08

## 2020-08-17 RX ADMIN — METHYLPREDNISOLONE SODIUM SUCCINATE 125 MG: 125 INJECTION, POWDER, FOR SOLUTION INTRAMUSCULAR; INTRAVENOUS at 12:08

## 2020-08-17 RX ADMIN — PIPERACILLIN AND TAZOBACTAM 4.5 G: 4; .5 INJECTION, POWDER, FOR SOLUTION INTRAVENOUS at 02:08

## 2020-08-17 RX ADMIN — LISINOPRIL 10 MG: 10 TABLET ORAL at 09:08

## 2020-08-17 RX ADMIN — INSULIN ASPART 10 UNITS: 100 INJECTION, SOLUTION INTRAVENOUS; SUBCUTANEOUS at 04:08

## 2020-08-17 RX ADMIN — INSULIN ASPART 5 UNITS: 100 INJECTION, SOLUTION INTRAVENOUS; SUBCUTANEOUS at 09:08

## 2020-08-17 RX ADMIN — VANCOMYCIN HYDROCHLORIDE 2000 MG: 1 INJECTION, POWDER, LYOPHILIZED, FOR SOLUTION INTRAVENOUS at 08:08

## 2020-08-17 RX ADMIN — PIPERACILLIN AND TAZOBACTAM 4.5 G: 4; .5 INJECTION, POWDER, FOR SOLUTION INTRAVENOUS at 12:08

## 2020-08-17 RX ADMIN — INSULIN ASPART 2 UNITS: 100 INJECTION, SOLUTION INTRAVENOUS; SUBCUTANEOUS at 12:08

## 2020-08-17 RX ADMIN — LORAZEPAM 0.5 MG: 0.5 TABLET ORAL at 11:08

## 2020-08-17 RX ADMIN — OXYCODONE HYDROCHLORIDE AND ACETAMINOPHEN 1 TABLET: 5; 325 TABLET ORAL at 07:08

## 2020-08-17 RX ADMIN — OXYCODONE HYDROCHLORIDE AND ACETAMINOPHEN 1 TABLET: 5; 325 TABLET ORAL at 04:08

## 2020-08-17 RX ADMIN — OXYCODONE HYDROCHLORIDE AND ACETAMINOPHEN 1 TABLET: 5; 325 TABLET ORAL at 11:08

## 2020-08-17 RX ADMIN — INSULIN ASPART 8 UNITS: 100 INJECTION, SOLUTION INTRAVENOUS; SUBCUTANEOUS at 12:08

## 2020-08-17 NOTE — PROGRESS NOTES
[facility] - Med Surg  General Surgery  Progress Note  08/17/2020    Patient Name: Serenity Ba  MRN: [id number]  Admission Date: 8/15/2020  Hospital Day: 3    POD #:  1  Antibiotics:  Vancomycin/Zosyn - Day 3      Interval History:  No complaints.  Pain control.  No nausea or vomiting.  Tolerating diet.  Mobility good.  Tolerating wound care.  Passing stool and flatus.    Vitals:  Afebrile.  Good vital signs.  Good room air oxygen saturations.    I/O:  Inaccurate  UOP:  Good      Exam:   Gen - Comfortable.  Nontoxic.  No acute distress.  CV - Regular rate and rhythm.  Good perfusion.  No edema.  Pulm - Clear to auscultation.  Nonlabored.   Abd - Soft.  Nondistended.  Expected tenderness only.  Normoactive normopitched bowel sounds.   Integument - No rashes.  No decubiti.  No jaundice.  Wound clean without erythema, edema, exudate, induration, fluctuance, crepitus, necrosis, discoloration, separation, etc.  Ext - No edema.  No cords.  No tenderness.      Lab Results:  No leukocytosis.  No anemia.  No thrombocytopenia.  Mild hyponatremia.  Mild hypocalcemia.  Otherwise all electrolytes are in the range of normal.  BUN and creatinine good.  Hyperglycemia.  No evidence of hepatocellular disease or biliary obstruction.  Urine culture shows evidence of E coli.  Abscess culture pending.    Radiology Results:  No new imaging studies    Pathology:  Pending    Assessment:  Clinically stable.  Satisfactory postoperative recovery.  No evident complications.  Wound clean.  Tolerating wound care.  Cultures pending.    Recommendations:  Continue wound care with daily packing changes until wound closes by secondary intention.  Follow-up cultures when available.  Adjust antibiotics if indicated.  Discharge planning.  Discharge home when wound culture confirms adequate coverage with oral antibiotics and after arrangements are made for outpatient wound care.  Follow-up surgery clinic 1 month after discharge.   Further recommendations will depend upon clinical course.    Plan:  Will follow while hospitalized.  Further management will depend upon clinical course.        Burak Cevallos MD FACS

## 2020-08-17 NOTE — NURSING
PT UP TO [facility]/PT SNAGGED HER IV ON THE FRAME AND PULLED JELCO OUT/JELCO WAS FOUND TO BE INTACT/NO BLEEDING NOTED FROM SITE.

## 2020-08-18 VITALS
HEART RATE: 84 BPM | TEMPERATURE: 97 F | WEIGHT: 293 LBS | SYSTOLIC BLOOD PRESSURE: 190 MMHG | DIASTOLIC BLOOD PRESSURE: 85 MMHG | RESPIRATION RATE: 17 BRPM | BODY MASS INDEX: 47.09 KG/M2 | OXYGEN SATURATION: 97 % | HEIGHT: 66 IN

## 2020-08-18 LAB
BACTERIA SPEC AEROBE CULT: ABNORMAL
BACTERIA UR CULT: ABNORMAL
MAGNESIUM SERPL-MCNC: 2.1 MG/DL (ref 1.6–2.6)
PHOSPHATE SERPL-MCNC: 3.8 MG/DL (ref 2.7–4.5)
POCT GLUCOSE: 397 MG/DL (ref 70–110)
POCT GLUCOSE: 425 MG/DL (ref 70–110)
POCT GLUCOSE: 434 MG/DL (ref 70–110)

## 2020-08-18 PROCEDURE — G0378 HOSPITAL OBSERVATION PER HR: HCPCS

## 2020-08-18 PROCEDURE — 96372 THER/PROPH/DIAG INJ SC/IM: CPT

## 2020-08-18 PROCEDURE — 83735 ASSAY OF MAGNESIUM: CPT

## 2020-08-18 PROCEDURE — 99024 POSTOP FOLLOW-UP VISIT: CPT | Mod: ,,, | Performed by: SURGERY

## 2020-08-18 PROCEDURE — 99219 PR INITIAL OBSERVATION CARE,LEVL II: CPT | Mod: ,,, | Performed by: FAMILY MEDICINE

## 2020-08-18 PROCEDURE — 84100 ASSAY OF PHOSPHORUS: CPT

## 2020-08-18 PROCEDURE — 96374 THER/PROPH/DIAG INJ IV PUSH: CPT | Mod: 59

## 2020-08-18 PROCEDURE — 99219 PR INITIAL OBSERVATION CARE,LEVL II: ICD-10-PCS | Mod: ,,, | Performed by: FAMILY MEDICINE

## 2020-08-18 PROCEDURE — 25000003 PHARM REV CODE 250: Performed by: FAMILY MEDICINE

## 2020-08-18 PROCEDURE — 25000003 PHARM REV CODE 250: Performed by: SURGERY

## 2020-08-18 PROCEDURE — 96376 TX/PRO/DX INJ SAME DRUG ADON: CPT

## 2020-08-18 PROCEDURE — 63600175 PHARM REV CODE 636 W HCPCS: Performed by: SURGERY

## 2020-08-18 PROCEDURE — 36415 COLL VENOUS BLD VENIPUNCTURE: CPT

## 2020-08-18 PROCEDURE — 99024 PR POST-OP FOLLOW-UP VISIT: ICD-10-PCS | Mod: ,,, | Performed by: SURGERY

## 2020-08-18 RX ORDER — INSULIN PUMP SYRINGE, 3 ML
EACH MISCELLANEOUS
Qty: 1 EACH | Refills: 0 | Status: SHIPPED | OUTPATIENT
Start: 2020-08-18 | End: 2021-08-18

## 2020-08-18 RX ORDER — AMOXICILLIN AND CLAVULANATE POTASSIUM 875; 125 MG/1; MG/1
1 TABLET, FILM COATED ORAL EVERY 12 HOURS
Qty: 16 TABLET | Refills: 0 | Status: SHIPPED | OUTPATIENT
Start: 2020-08-18

## 2020-08-18 RX ORDER — OXYCODONE AND ACETAMINOPHEN 5; 325 MG/1; MG/1
1 TABLET ORAL EVERY 4 HOURS PRN
Qty: 15 TABLET | Refills: 0 | Status: SHIPPED | OUTPATIENT
Start: 2020-08-18

## 2020-08-18 RX ORDER — LANCING DEVICE
1 EACH MISCELLANEOUS 2 TIMES DAILY WITH MEALS
Qty: 1 EACH | Refills: 0 | Status: SHIPPED | OUTPATIENT
Start: 2020-08-18 | End: 2021-08-18

## 2020-08-18 RX ORDER — PEN NEEDLE, DIABETIC 30 GX3/16"
1 NEEDLE, DISPOSABLE MISCELLANEOUS 2 TIMES DAILY WITH MEALS
Qty: 100 EACH | Refills: 11 | Status: SHIPPED | OUTPATIENT
Start: 2020-08-18

## 2020-08-18 RX ORDER — HUMAN INSULIN 100 [IU]/ML
18 INJECTION, SUSPENSION SUBCUTANEOUS 2 TIMES DAILY
Qty: 10.8 ML | Refills: 11 | Status: SHIPPED | OUTPATIENT
Start: 2020-08-18 | End: 2021-08-18

## 2020-08-18 RX ORDER — LANCETS
1 EACH MISCELLANEOUS 2 TIMES DAILY WITH MEALS
Qty: 100 EACH | Refills: 11 | Status: SHIPPED | OUTPATIENT
Start: 2020-08-18

## 2020-08-18 RX ORDER — METHYLPREDNISOLONE SOD SUCC 125 MG
125 VIAL (EA) INJECTION EVERY 12 HOURS
Status: DISCONTINUED | OUTPATIENT
Start: 2020-08-18 | End: 2020-08-18 | Stop reason: HOSPADM

## 2020-08-18 RX ADMIN — OXYCODONE HYDROCHLORIDE AND ACETAMINOPHEN 1 TABLET: 5; 325 TABLET ORAL at 11:08

## 2020-08-18 RX ADMIN — METHYLPREDNISOLONE SODIUM SUCCINATE 125 MG: 125 INJECTION, POWDER, FOR SOLUTION INTRAMUSCULAR; INTRAVENOUS at 05:08

## 2020-08-18 RX ADMIN — INSULIN ASPART 10 UNITS: 100 INJECTION, SOLUTION INTRAVENOUS; SUBCUTANEOUS at 08:08

## 2020-08-18 RX ADMIN — OXYCODONE HYDROCHLORIDE AND ACETAMINOPHEN 1 TABLET: 5; 325 TABLET ORAL at 07:08

## 2020-08-18 RX ADMIN — ONDANSETRON HYDROCHLORIDE 4 MG: 2 SOLUTION INTRAMUSCULAR; INTRAVENOUS at 04:08

## 2020-08-18 RX ADMIN — INSULIN ASPART 5 UNITS: 100 INJECTION, SOLUTION INTRAVENOUS; SUBCUTANEOUS at 12:08

## 2020-08-18 RX ADMIN — LORAZEPAM 1 MG: 1 TABLET ORAL at 08:08

## 2020-08-18 RX ADMIN — INSULIN ASPART 10 UNITS: 100 INJECTION, SOLUTION INTRAVENOUS; SUBCUTANEOUS at 11:08

## 2020-08-18 RX ADMIN — LEVOTHYROXINE SODIUM 100 MCG: 25 TABLET ORAL at 05:08

## 2020-08-18 RX ADMIN — LISINOPRIL 10 MG: 10 TABLET ORAL at 08:08

## 2020-08-18 RX ADMIN — OXYCODONE HYDROCHLORIDE AND ACETAMINOPHEN 1 TABLET: 5; 325 TABLET ORAL at 02:08

## 2020-08-18 RX ADMIN — PIPERACILLIN AND TAZOBACTAM 4.5 G: 4; .5 INJECTION, POWDER, FOR SOLUTION INTRAVENOUS at 03:08

## 2020-08-18 NOTE — DISCHARGE SUMMARY
[facility] - Med Surg Hospital Medicine  Discharge Summary      Patient Name: Serenity Ba  MRN: [id number]  Admission Date: 8/15/2020  Hospital Length of Stay: 0 days  Discharge Date and Time:  08/18/2020 12:53 PM  Attending Physician: Jack Hill MD   Discharging Provider: Ave Bruno MD  Primary Care Provider: Primary Doctor No        HPI:   Patient is a 40 y.o. female who has a past medical history of Asthma, Diabetes mellitus, Hypertension, and Thyroid disease presented with increasing pain and drainage from her right buttock abscess. Patient stated it started off small and she was able to pop it and express drainage from it. It improved some then it started bleeding and becoming bigger in size. She now presented to ED with abscess to her right buttock that is actively draining. ED unable to perform I&D at bedside. Work up showed WBC count of 12K and normal lactic acid. CT scan of pelvis is currently pending. Patient currently denies pain but is in tears because she is unable to see her .     Procedure(s) (LRB):  INCISION AND DRAINAGE, ABSCESS (Right)  DEBRIDEMENT, BUTTOCK (Right)      Hospital Course:   08/16/2020  Patient admitted and started on IV antibiotics.  General surgery evaluated the patient and recommended I&D of the buttock abscess.  Patient also with anxiety and panic attacks so started on Ativan 0.5 mg p.r.n. as well as Percocet p.r.n. for pain.  Patient has not been able to take her Effexor in over a month and was only able to get it infrequently prior to this so will not continue it.  Will need to discuss alternative medications.     08/17/2020  Continue IV antibiotics.  Patient is now s/p I&D of buttock abscess.  Ativan increase to 1 mg to help with anxiety which is working.  Pain is well controlled.  Patient doing well overall.  Anticipate discharge once cultures have resulted.    8/18/2020  Discharged home in good condition once susceptibilities and  sensitivities returned on wound culture showing proteus mirabilis. Patient discharged home with Augmentin to complete 10 day total course of antibiotics.  Of note, urine culture grew Citrobacter also sensitive to Augmentin.  Blood cultures grew out contaminant.  Follow-up scheduled with General surgery and Family Medicine.  Spouse to provide wound care needs.  Patient has terribly uncontrolled diabetes.  Was discharged home on Levemir 18 units b.i.d. and told to resume aspart with meals.  Further management per PCP.  Patient would likely benefit from diabetic education.    Consults:   Consults (From admission, onward)        Status Ordering Provider     Inpatient consult to General Surgery  Once     Provider:  Burak Cevallos MD    Completed EDIS BRYAN     Pharmacy to dose Vancomycin consult  Once     Provider:  (Not yet assigned)    Acknowledged BURAK CEVALLOS          Final Active Diagnoses:    Diagnosis Date Noted POA    PRINCIPAL PROBLEM:  Abscess of buttock, right [L02.31] 08/16/2020 Yes    Encounter for postoperative care [Z48.89]  Not Applicable    Type 2 diabetes mellitus without complication, with long-term current use of insulin [E11.9, Z79.4] 08/16/2020 Not Applicable    Essential hypertension [I10] 08/16/2020 Yes    Morbid obesity [E66.01] 08/16/2020 Yes    BMI 60.0-69.9, adult [Z68.44] 08/16/2020 Not Applicable    Preop cardiovascular exam [Z01.810] 08/16/2020 Not Applicable      Problems Resolved During this Admission:      Discharged Condition: good    Disposition: Home or Self Care    Follow Up:    Patient Instructions:      Diet diabetic     Notify your health care provider if you experience any of the following:  temperature >100.4     Notify your health care provider if you experience any of the following:  persistent nausea and vomiting or diarrhea     Notify your health care provider if you experience any of the following:  severe uncontrolled pain     Notify your health care  "provider if you experience any of the following:  redness, tenderness, or signs of infection (pain, swelling, redness, odor or green/yellow discharge around incision site)     Activity as tolerated     Medications:  Reconciled Home Medications:      Medication List      START taking these medications    amoxicillin-clavulanate 875-125mg 875-125 mg per tablet  Commonly known as: AUGMENTIN  Take 1 tablet by mouth every 12 (twelve) hours.     blood sugar diagnostic Strp  1 strip by Misc.(Non-Drug; Combo Route) route 2 (two) times daily with meals.     blood-glucose meter kit  Use as instructed     lancets Misc  1 lancet by Misc.(Non-Drug; Combo Route) route 2 (two) times daily with meals.     lancing device Misc  1 Device by Misc.(Non-Drug; Combo Route) route 2 (two) times daily with meals.     NovoLIN 70-30 FlexPen U-100 100 unit/mL (70-30) Inpn pen  Generic drug: insulin NPH/Reg human  Inject 18 Units into the skin 2 (two) times a day.     oxyCODONE-acetaminophen 5-325 mg per tablet  Commonly known as: PERCOCET  Take 1 tablet by mouth every 4 (four) hours as needed.     pen needle, diabetic 31 gauge x 5/16" Ndle  1 each by Misc.(Non-Drug; Combo Route) route 2 (two) times daily with meals.        CONTINUE taking these medications    budesonide-formoterol 160-4.5 mcg 160-4.5 mcg/actuation Hfaa  Commonly known as: SYMBICORT  Inhale 2 puffs into the lungs every 12 (twelve) hours. Controller     furosemide 20 MG tablet  Commonly known as: LASIX  Take 20 mg by mouth 2 (two) times daily.     insulin aspart U-100 100 unit/mL injection  Commonly known as: NOVOLOG  Inject into the skin 3 (three) times daily before meals.     levothyroxine 100 MCG tablet  Commonly known as: SYNTHROID  Take 100 mcg by mouth once daily.     lisinopriL 10 MG tablet  Take 10 mg by mouth once daily.     venlafaxine 37.5 MG Tab  Commonly known as: EFFEXOR  Take by mouth 2 (two) times daily.            Significant Diagnostic Studies: Labs:   BMP: "   Recent Labs   Lab 08/17/20  0619 08/18/20  0632   *  --    *  --    K 4.3  --    CL 98  --    CO2 26  --    BUN 10  --    CREATININE 0.8  --    CALCIUM 8.3*  --    MG 1.9 2.1   , CMP   Recent Labs   Lab 08/17/20  0619   *   K 4.3   CL 98   CO2 26   *   BUN 10   CREATININE 0.8   CALCIUM 8.3*   PROT 6.5   ALBUMIN 2.8*   BILITOT 0.6   ALKPHOS 70   AST 18   ALT 20   ANIONGAP 9   ESTGFRAFRICA >60.0   EGFRNONAA >60.0   , CBC   Recent Labs   Lab 08/17/20  0619   WBC 11.28   HGB 12.0   HCT 39.4      , A1C: No results for input(s): HGBA1C in the last 4320 hours. and All labs within the past 24 hours have been reviewed  Microbiology:   Blood Culture   Lab Results   Component Value Date    LABBLOO No Growth to date 08/16/2020    LABBLOO No Growth to date 08/16/2020   , Urine Culture    Lab Results   Component Value Date    LABURIN CITROBACTER FARMERI  >100,000 cfu/ml   (A) 08/16/2020    and Wound Culture: positive for Proteus species    Pending Diagnostic Studies:     Procedure Component Value Units Date/Time    Specimen to Pathology, Surgery General Surgery [[id number]] Collected: 08/16/20 1341    Order Status: Sent Lab Status: In process Updated: 08/17/20 1013        Indwelling Lines/Drains at time of discharge:   Lines/Drains/Airways     None                 Time spent on the discharge of patient: 35 minutes  Patient was seen and examined on the date of discharge and determined to be suitable for discharge.         Ave Bruno MD  Department of Hospital Medicine  [facility] - Med [facility]urg

## 2020-08-18 NOTE — NURSING
DC INSTRUCTIONS/WOUND CARE INSTRUCTIONS, S/SX OF INFECTION TO REPORT TO SURGEON/MD, PRESCRIPTIONS REVIEWED WITH PT/PT VERBALIZED UNDERSTANDING. ALL BELONGINGS PACKED PER PT AND TAKEN TO POV BY SPOUSE.

## 2020-08-18 NOTE — SUBJECTIVE & OBJECTIVE
Interval History:  NO ACUTE EVENTS    Review of Systems   Constitutional: Negative for fatigue and fever.   HENT: Negative.    Eyes: Negative for visual disturbance.   Respiratory: Negative for shortness of breath.    Cardiovascular: Negative for chest pain.   Gastrointestinal: Negative.    Genitourinary: Negative.    Musculoskeletal: Negative.         Mild right buttock pain, left wrist pain   Skin: Negative.    Neurological: Negative.    Hematological: Negative.    Psychiatric/Behavioral: Negative.      Objective:     Vital Signs (Most Recent):  Temp: 98.2 °F (36.8 °C) (08/17/20 1520)  Pulse: 102 (08/17/20 1520)  Resp: 18 (08/17/20 1624)  BP: (!) 154/65 (08/17/20 1520)  SpO2: 95 % (08/17/20 1520) Vital Signs (24h Range):  Temp:  [96.4 °F (35.8 °C)-98.2 °F (36.8 °C)] 98.2 °F (36.8 °C)  Pulse:  [] 102  Resp:  [16-21] 18  SpO2:  [92 %-100 %] 95 %  BP: (126-154)/(49-77) 154/65     Weight: (!) 193.2 kg (426 lb)  Body mass index is 68.76 kg/m².    Intake/Output Summary (Last 24 hours) at 8/17/2020 1905  Last data filed at 8/17/2020 1500  Gross per 24 hour   Intake 3840 ml   Output 400 ml   Net 3440 ml      Physical Exam  Vitals signs reviewed.   Constitutional:       General: She is not in acute distress.     Appearance: Normal appearance. She is obese.   HENT:      Head: Normocephalic and atraumatic.      Right Ear: External ear normal.      Left Ear: External ear normal.      Nose: Nose normal.      Mouth/Throat:      Mouth: Mucous membranes are moist.   Eyes:      Conjunctiva/sclera: Conjunctivae normal.   Cardiovascular:      Rate and Rhythm: Normal rate and regular rhythm.      Pulses: Normal pulses.      Heart sounds: No murmur.   Pulmonary:      Effort: Pulmonary effort is normal. No respiratory distress.      Breath sounds: No wheezing or rales.   Abdominal:      General: Abdomen is flat. Bowel sounds are normal. There is no distension.   Musculoskeletal:      Right lower leg: No edema.      Left lower  leg: No edema.   Skin:     General: Skin is warm and dry.      Findings: No erythema.   Neurological:      Mental Status: She is alert and oriented to person, place, and time. Mental status is at baseline.   Psychiatric:         Mood and Affect: Mood normal.         Behavior: Behavior normal.         Significant Labs:   Recent Lab Results       08/17/20  1638   08/17/20  1127   08/17/20  0619   08/17/20  0605   08/17/20  0000        Albumin     2.8         Alkaline Phosphatase     70         ALT     20         Anion Gap     9         AST     18         Baso #     0.11         Basophil%     1.0         BILIRUBIN TOTAL     0.6  Comment:  For infants and newborns, interpretation of results should be based  on gestational age, weight and in agreement with clinical  observations.  Premature Infant recommended reference ranges:  Up to 24 hours.............<8.0 mg/dL  Up to 48 hours............<12.0 mg/dL  3-5 days..................<15.0 mg/dL  6-29 days.................<15.0 mg/dL           BUN, Bld     10         Calcium     8.3         Chloride     98         CO2     26         Creatinine     0.8         Differential Method     Automated         eGFR if      >60.0         eGFR if non      >60.0  Comment:  Calculation used to obtain the estimated glomerular filtration  rate (eGFR) is the CKD-EPI equation.            Eos #     0.3         Eosinophil%     2.3         Glucose     220         Gran # (ANC)     7.5         Gran%     66.6         Hematocrit     39.4         Hemoglobin     12.0         Immature Grans (Abs)     0.06  Comment:  Mild elevation in immature granulocytes is non specific and   can be seen in a variety of conditions including stress response,   acute inflammation, trauma and pregnancy. Correlation with other   laboratory and clinical findings is essential.           Immature Granulocytes     0.5         Lymph #     2.3         Lymph%     20.7         Magnesium     1.9          MCH     26.7         MCHC     30.5         MCV     88         Mono #     1.0         Mono%     8.9         MPV     11.1         nRBC     0         Phosphorus     3.5         Platelets     272         POCT Glucose 376 318   219 241     Potassium     4.3         PROTEIN TOTAL     6.5         RBC     4.50         RDW     13.9         Sodium     133         WBC     11.28                            All pertinent labs within the past 24 hours have been reviewed.    Significant Imaging: I have reviewed all pertinent imaging results/findings within the past 24 hours.

## 2020-08-18 NOTE — PLAN OF CARE
08/17/20 1315   Discharge Assessment   Assessment Type Discharge Planning Assessment   Confirmed/corrected address and phone number on facesheet? Yes  (Pt is homeless)   Assessment information obtained from? Patient   Expected Length of Stay (days) 2   Communicated expected length of stay with patient/caregiver yes   Prior to hospitilization cognitive status: Alert/Oriented   Prior to hospitalization functional status: Independent   Current cognitive status: Alert/Oriented   Current Functional Status: Independent   Facility Arrived From: Car   Lives With spouse  (PT and her spouse are living in their car.)   Able to Return to Prior Arrangements yes  (Pt and her spouse plan to continue living in their car)   Is patient able to care for self after discharge? Yes   Who are your caregiver(s) and their phone number(s)? self   Patient's perception of discharge disposition homeless   Readmission Within the Last 30 Days no previous admission in last 30 days   Patient currently being followed by outpatient case management? No   Patient currently receives any other outside agency services? No   Equipment Currently Used at Home none   Do you have any problems affording any of your prescribed medications? No  (Pt obtained medication through Medicaid)   Is the patient taking medications as prescribed? no  (Pt has not maintained a PCP for consistent refills)   Does the patient have transportation home? Yes   Transportation Anticipated car, drives self;family or friend will provide  (Pt and spouse live in their car)   Dialysis Name and Scheduled days n/a   Does the patient receive services at the Coumadin Clinic? No   Discharge Plan A Other  (Pt is homeless)   DME Needed Upon Discharge  none   Patient/Family in Agreement with Plan yes  (Pt and spouse are choosing to continue living in their car)     Pt and her spouse are both present during this assessment. They report currently living in their car. Initially stating they came  to the area to reunite with spouse's family and things did not go well. They had to leave there and then paid rent in full, but can no longer live there because of unfavorable people living in the house. Pt states she has the ability to get prescription medications, but has not been able to maintain PCP to get refills when needed.     Spouse reports he is willing and able to provide wound care to the pt. They then disclose the pt has a brother in the local vicinity that will allow them to shower and she has a place to lie down for dressing changes in his home. SW inquired about living with her brother and the pt responded they were unable to at this time.     Pt and spouse both state when she gets her check on the 26th they can find a place to live. Pt has identified she needs financial assistance for wound care supplies if available. SW will attempt to locate resources for assistance prior to discharge.

## 2020-08-18 NOTE — PLAN OF CARE
Problem:  Fall Injury Risk  Goal: Absence of Fall, Infant Drop and Related Injury  Outcome: Adequate for Care Transition     Problem: Adult Inpatient Plan of Care  Goal: Plan of Care Review  Outcome: Adequate for Care Transition  Goal: Patient-Specific Goal (Individualization)  Outcome: Adequate for Care Transition  Goal: Absence of Hospital-Acquired Illness or Injury  Outcome: Adequate for Care Transition  Goal: Optimal Comfort and Wellbeing  Outcome: Adequate for Care Transition  Goal: Readiness for Transition of Care  Outcome: Adequate for Care Transition  Goal: Rounds/Family Conference  Outcome: Adequate for Care Transition     Problem: Bariatric Environmental Safety  Goal: Safety Maintained with Care  Outcome: Adequate for Care Transition     Problem: Diabetes Comorbidity  Goal: Blood Glucose Level Within Desired Range  Outcome: Adequate for Care Transition     Problem: Skin or Soft Tissue Infection  Goal: Infection Symptom Resolution  Outcome: Adequate for Care Transition        GOALS ADEQUATE FOR DC

## 2020-08-18 NOTE — NURSING
PT IS UPSET OVER FAMILY ISSUES AND HAS REFUSED ALL IV ANTIBIOTICS/MS IS AWARE. PT IS DEMANDING TO LEAVE/PT AT THIS TIME DOES NOT HAVE ANYONE TO PICK HER UP/POV IS IN PARKING LOT AND NEEDS  WORK. PT STATES THAT SHE HAS CALLED HER BROTHER WHO IS COMING FROM [facility], TO FIX HER VEHICLE LATER TODAY. PT HAS AGREED TO HAVE THE PACKING/DRSG CHANGED ON WOUND.

## 2020-08-18 NOTE — PLAN OF CARE
Problem:  Fall Injury Risk  Goal: Absence of Fall, Infant Drop and Related Injury  Outcome: Ongoing, Progressing  Intervention: Promote Injury-Free Environment  Flowsheets (Taken 2020 0258)  Safety Promotion/Fall Prevention:   assistive device/personal item within reach   side rails raised x 2   nonskid shoes/socks when out of bed  Environmental Safety Modification: assistive device/personal items within reach     Problem: Adult Inpatient Plan of Care  Goal: Plan of Care Review  Outcome: Ongoing, Progressing  Flowsheets (Taken 2020 0258)  Plan of Care Reviewed With: patient  Goal: Patient-Specific Goal (Individualization)  Outcome: Ongoing, Progressing  Flowsheets (Taken 2020 0258)  Individualized Care Needs: FAMILY REQUEST PT  TO STAY WITH PT DUE TO HX ANXIETY.  Anxieties, Fears or Concerns: PT DENIES ANXIETY AT THIS TIME.  Patient-Specific Goals (Include Timeframe): PT WILL HAVE NO ANXIETY ON THIS SHIFT.  Goal: Absence of Hospital-Acquired Illness or Injury  Outcome: Ongoing, Progressing  Intervention: Identify and Manage Fall Risk  Flowsheets (Taken 2020 0258)  Safety Promotion/Fall Prevention:   assistive device/personal item within reach   side rails raised x 2   nonskid shoes/socks when out of bed  Intervention: Prevent VTE (venous thromboembolism)  Flowsheets (Taken 2020 0258)  VTE Prevention/Management: remove, assess skin and reapply compression stockings  Goal: Optimal Comfort and Wellbeing  Outcome: Ongoing, Progressing  Intervention: Provide Person-Centered Care  Flowsheets (Taken 2020 0258)  Trust Relationship/Rapport:   care explained   choices provided   emotional support provided   empathic listening provided   questions answered   questions encouraged   reassurance provided  Goal: Readiness for Transition of Care  Outcome: Ongoing, Progressing  Goal: Rounds/Family Conference  Outcome: Ongoing, Progressing  Flowsheets (Taken 2020  0258)  Participants: patient     Problem: Diabetes Comorbidity  Goal: Blood Glucose Level Within Desired Range  Outcome: Ongoing, Progressing  Intervention: Maintain Glycemic Control  Flowsheets (Taken 8/18/2020 0258)  Glycemic Management: blood glucose monitoring     Problem: Skin or Soft Tissue Infection  Goal: Infection Symptom Resolution  Outcome: Ongoing, Progressing  Intervention: Provide Meticulous Infection Site Care  Flowsheets (Taken 8/18/2020 0258)  Infection Prevention:   equipment surfaces disinfected   rest/sleep promoted  Fever Reduction/Comfort Measures: humidification temperature decreased  Intervention: Minimize and Manage Infection Progression  Flowsheets (Taken 8/18/2020 0258)  Infection Management: aseptic technique maintained   PT LYING SEMI-LUBIN POSITION NORMAL UNLABORED BREATHING O 2 2L/NC IN USE. NO CYANOSIS NOTED. NO COUGHING NOTED. NO SIGN DISTRESS NOTED. NO ANXIETY NOTED.

## 2020-08-18 NOTE — PROGRESS NOTES
[facility] - Med Surg  General Surgery  Progress Note  08/18/2020    Patient Name: Serenity Ba  MRN: [id number]  Admission Date: 8/15/2020  Hospital Day: 4    POD #:  2  Antibiotics:  Vancomycin/Zosyn - Day 4      Interval History:  No complaints.  Pain controlled.  No nausea or vomiting.  Tolerating diet.  Mobility good.  Tolerating wound care.  Passing stool and flatus.    Vitals:  Afebrile.  Good vital signs.  Good room air oxygen saturations.    I/O:  Inaccurate  UOP:  Good      Exam:   Gen - Comfortable.  Nontoxic.  No acute distress.  CV - Regular rate and rhythm.  Good perfusion.  No edema.  Pulm - Clear to auscultation.  Nonlabored.   Abd - Soft.  Nondistended.  Expected tenderness only.  Normoactive normopitched bowel sounds.   Integument - No rashes.  No decubiti.  No jaundice.  Wound clean without erythema, edema, exudate, induration, fluctuance, crepitus, necrosis, discoloration, separation, etc.  Ext - No edema.  No cords.  No tenderness.      Lab Results:  Preliminary aerobic abscess culture growing Proteus sensitive to Augmentin.  Anaerobic abscess culture pending.  Urine culture growing Citrobacter sensitive to Augmentin.    Radiology Results:  No new imaging studies    Pathology:  Pending    Assessment:  Clinically stable.  Satisfactory postoperative recovery.  No evident complications.  Wound clean.  Tolerating wound care.      Recommendations:  Continue wound care with daily packing changes until wound closes by secondary intention.  No contraindication to discharge with Augmentin and outpatient wound care.  Follow-up surgery clinic 1 month after discharge.  Further recommendations will depend upon clinical course.    Plan:  Will follow while hospitalized.  Will follow up in surgery clinic 1 month after discharge.  Further management will depend upon clinical course.        Burak Cevallos MD FACS

## 2020-08-18 NOTE — ASSESSMENT & PLAN NOTE
Lab Results   Component Value Date    WBC 11.28 08/17/2020     CT scan pending  Lactic acid normal   Start IV Vanc and Zosyn  Draw blood and wound cultures  Consult general surgery    08/16/2020  CT scan showing abscess  General surgery has taken patient to the OR for I&D of abscess today.  Appreciate their recommendations in this case  Will continue IV antibiotics  Follow-up cultures  Monitor daily CBC and BMP  Monitor for fever    08/17/2020  Patient is POD #1 s/p I&D of abscess.  Continue to follow recommendations of General surgery, appreciate them greatly.  Continue IV antibiotics.  Follow-up cultures before discharge.  Continue to monitor daily CBC and BMP.  Patient is progressing well.

## 2020-08-18 NOTE — PROGRESS NOTES
[facility] - Med Surg Hospital Medicine  Progress Note    Patient Name: Serenity Ba  MRN: [id number]  Patient Class: OP- Observation   Admission Date: 8/15/2020  Length of Stay: 0 days  Attending Physician: Jack Hill MD  Primary Care Provider: Primary Doctor No        Subjective:     Principal Problem:Abscess of buttock, right        HPI:  History of Present Illness:  Patient is a 40 y.o. female who has a past medical history of Asthma, Diabetes mellitus, Hypertension, and Thyroid disease presented with increasing pain and drainage from her right buttock abscess. Patient stated it started off small and she was able to pop it and express drainage from it. It improved some then it started bleeding and becoming bigger in size. She now presented to ED with abscess to her right buttock that is actively draining. ED unable to perform I&D at bedside. Work up showed WBC count of 12K and normal lactic acid. CT scan of pelvis is currently pending. Patient currently denies pain but is in tears because she is unable to see her .     Overview/Hospital Course:  08/16/2020  Patient admitted and started on IV antibiotics.  General surgery evaluated the patient and recommended I&D of the buttock abscess.  Patient also with anxiety and panic attacks so started on Ativan 0.5 mg p.r.n. as well as Percocet p.r.n. for pain.  Patient has not been able to take her Effexor in over a month and was only able to get it infrequently prior to this so will not continue it.  Will need to discuss alternative medications.    08/17/2020  Continue IV antibiotics.  Patient is now s/p I&D of buttock abscess.  Ativan increase to 1 mg to help with anxiety which is working.  Pain is well controlled.  Patient doing well overall.  Anticipate discharge once cultures have resulted.    Interval History:  NO ACUTE EVENTS    Review of Systems   Constitutional: Negative for fatigue and fever.   HENT: Negative.    Eyes: Negative  Outgoing call to the patient. Reviewed surgery instructions. Patient verbalized. Encouraged to call with any questions or concerns.    Letter placed in the mail.     Given to Jennifer lucia.        for visual disturbance.   Respiratory: Negative for shortness of breath.    Cardiovascular: Negative for chest pain.   Gastrointestinal: Negative.    Genitourinary: Negative.    Musculoskeletal: Negative.         Mild right buttock pain, left wrist pain   Skin: Negative.    Neurological: Negative.    Hematological: Negative.    Psychiatric/Behavioral: Negative.      Objective:     Vital Signs (Most Recent):  Temp: 98.2 °F (36.8 °C) (08/17/20 1520)  Pulse: 102 (08/17/20 1520)  Resp: 18 (08/17/20 1624)  BP: (!) 154/65 (08/17/20 1520)  SpO2: 95 % (08/17/20 1520) Vital Signs (24h Range):  Temp:  [96.4 °F (35.8 °C)-98.2 °F (36.8 °C)] 98.2 °F (36.8 °C)  Pulse:  [] 102  Resp:  [16-21] 18  SpO2:  [92 %-100 %] 95 %  BP: (126-154)/(49-77) 154/65     Weight: (!) 193.2 kg (426 lb)  Body mass index is 68.76 kg/m².    Intake/Output Summary (Last 24 hours) at 8/17/2020 1905  Last data filed at 8/17/2020 1500  Gross per 24 hour   Intake 3840 ml   Output 400 ml   Net 3440 ml      Physical Exam  Vitals signs reviewed.   Constitutional:       General: She is not in acute distress.     Appearance: Normal appearance. She is obese.   HENT:      Head: Normocephalic and atraumatic.      Right Ear: External ear normal.      Left Ear: External ear normal.      Nose: Nose normal.      Mouth/Throat:      Mouth: Mucous membranes are moist.   Eyes:      Conjunctiva/sclera: Conjunctivae normal.   Cardiovascular:      Rate and Rhythm: Normal rate and regular rhythm.      Pulses: Normal pulses.      Heart sounds: No murmur.   Pulmonary:      Effort: Pulmonary effort is normal. No respiratory distress.      Breath sounds: No wheezing or rales.   Abdominal:      General: Abdomen is flat. Bowel sounds are normal. There is no distension.   Musculoskeletal:      Right lower leg: No edema.      Left lower leg: No edema.   Skin:     General: Skin is warm and dry.      Findings: No erythema.   Neurological:      Mental Status: She is alert and  oriented to person, place, and time. Mental status is at baseline.   Psychiatric:         Mood and Affect: Mood normal.         Behavior: Behavior normal.         Significant Labs:   Recent Lab Results       08/17/20  1638   08/17/20  1127   08/17/20  0619   08/17/20  0605   08/17/20  0000        Albumin     2.8         Alkaline Phosphatase     70         ALT     20         Anion Gap     9         AST     18         Baso #     0.11         Basophil%     1.0         BILIRUBIN TOTAL     0.6  Comment:  For infants and newborns, interpretation of results should be based  on gestational age, weight and in agreement with clinical  observations.  Premature Infant recommended reference ranges:  Up to 24 hours.............<8.0 mg/dL  Up to 48 hours............<12.0 mg/dL  3-5 days..................<15.0 mg/dL  6-29 days.................<15.0 mg/dL           BUN, Bld     10         Calcium     8.3         Chloride     98         CO2     26         Creatinine     0.8         Differential Method     Automated         eGFR if      >60.0         eGFR if non      >60.0  Comment:  Calculation used to obtain the estimated glomerular filtration  rate (eGFR) is the CKD-EPI equation.            Eos #     0.3         Eosinophil%     2.3         Glucose     220         Gran # (ANC)     7.5         Gran%     66.6         Hematocrit     39.4         Hemoglobin     12.0         Immature Grans (Abs)     0.06  Comment:  Mild elevation in immature granulocytes is non specific and   can be seen in a variety of conditions including stress response,   acute inflammation, trauma and pregnancy. Correlation with other   laboratory and clinical findings is essential.           Immature Granulocytes     0.5         Lymph #     2.3         Lymph%     20.7         Magnesium     1.9         MCH     26.7         MCHC     30.5         MCV     88         Mono #     1.0         Mono%     8.9         MPV     11.1         nRBC      0         Phosphorus     3.5         Platelets     272         POCT Glucose 376 318   219 241     Potassium     4.3         PROTEIN TOTAL     6.5         RBC     4.50         RDW     13.9         Sodium     133         WBC     11.28                            All pertinent labs within the past 24 hours have been reviewed.    Significant Imaging: I have reviewed all pertinent imaging results/findings within the past 24 hours.      Assessment/Plan:      * Abscess of buttock, right  Lab Results   Component Value Date    WBC 11.28 08/17/2020     CT scan pending  Lactic acid normal   Start IV Vanc and Zosyn  Draw blood and wound cultures  Consult general surgery    08/16/2020  CT scan showing abscess  General surgery has taken patient to the OR for I&D of abscess today.  Appreciate their recommendations in this case  Will continue IV antibiotics  Follow-up cultures  Monitor daily CBC and BMP  Monitor for fever    08/17/2020  Patient is POD #1 s/p I&D of abscess.  Continue to follow recommendations of General surgery, appreciate them greatly.  Continue IV antibiotics.  Follow-up cultures before discharge.  Continue to monitor daily CBC and BMP.  Patient is progressing well.    Essential hypertension  BP Readings from Last 3 Encounters:   08/16/20 (!) 159/76   07/13/20 (!) 131/58   02/07/19 (!) 189/63     Restart home BP meds    Type 2 diabetes mellitus without complication, with long-term current use of insulin  Patient takes 25U 70/30 TID at home  Will convert to 15U Detemir BID  Hold prandial due to NPO  Low dose correction scale   Cont blood glucose monitoring   ADA diet      VTE Risk Mitigation (From admission, onward)         Ordered     Place ABI hose  Until discontinued      08/16/20 0125     IP VTE HIGH RISK PATIENT  Once      08/16/20 0125     Place sequential compression device  Until discontinued      08/16/20 0125                Discharge Planning   CELINA:      Code Status: Full Code   Is the patient medically  ready for discharge?:     Reason for patient still in hospital (select all that apply): Treatment                     Ave Bruno MD  Department of Hospital Medicine   [facility] - Med [facility]

## 2020-08-18 NOTE — NURSING
Pt c/o nausea no emesis noted. Cool compress applied to forehead. Administered zofran 4mg sivp per do for nausea.

## 2020-08-19 LAB
BACTERIA BLD CULT: ABNORMAL
BACTERIA SPEC AEROBE CULT: ABNORMAL

## 2020-08-19 NOTE — PLAN OF CARE
08/19/20 0856   Final Note   Assessment Type Final Discharge Note   Anticipated Discharge Disposition Home   What phone number can be called within the next 1-3 days to see how you are doing after discharge? [phone]   Hospital Follow Up  Appt(s) scheduled? Yes   Discharge plans and expectations educations in teach back method with documentation complete? Yes     Pt was discharged with her spouse to self care on 08/18/20. They are living in their car at this time per choice. SW offered during the initial assessment to provide resources for housing and they refused. The pt stated numerous times when she gets her check on 8/26/20 they would be fine. Only needs identified for discharge during this stay was assistance to obtain wound care supplies.     Prior to her discharge this day, a report was made on the couple having two dogs in the car while parked for two days in the parking lot. Animal control and police were dispatched and subsequently the spouse was arrested for an outstanding warrant.  Pt became excessively agitated throughout the morning due to this development. As an end result, the spouse was released from custody, returned to the facility with parts needed to repair their vehicle and the pt was discharged.     Prior to discharge, this SW confirmed with the pts pharmacy she has all medications remaining on her Medicaid for new prescriptions she will be discharged home with. PT then provided medication voucher with $100 value that [facility] will allow her to use for the purchase wound care supplies. Pt verbalized understanding and repeated instruction on obtaining her medications with insurance. Pt provided follow up appointments with PCP and surgery prior to her discharge. This plan was complete prior to discharge.

## 2020-08-20 ENCOUNTER — PATIENT OUTREACH (OUTPATIENT)
Dept: ADMINISTRATIVE | Facility: HOSPITAL | Age: 40
End: 2020-08-20

## 2020-08-20 NOTE — PROGRESS NOTES
Pre-visit Chart review notification  Spoke with pt about overdue HM screenings. Pt stated that it had been over a year since any scheduled labs were done. She stated that she did not have a family doctor. Asked her if she would like to see an NP or a MD and she said MD. Rescheduled her with Dr Arguello for 09/03/20 at 0920 at [facility]. She stated that it had been a long time since her last pap smear.

## 2020-08-21 LAB
BACTERIA BLD CULT: NORMAL
BACTERIA BLD CULT: NORMAL
BACTERIA SPEC ANAEROBE CULT: NORMAL
FINAL PATHOLOGIC DIAGNOSIS: NORMAL
GROSS: NORMAL

## (undated) DEVICE — SOL 9P NACL IRR PIC IL

## (undated) DEVICE — UNDERGLOVE BIOGEL PI SZ 6.5 LF

## (undated) DEVICE — GAUZE SPONGE 4X4 12PLY

## (undated) DEVICE — SEE MEDLINE ITEM 154981

## (undated) DEVICE — CANISTER SUCTION 3000CC

## (undated) DEVICE — GLOVE SURGEONS ULTRA TOUCH 6.5

## (undated) DEVICE — TAPE ADH MEDIPORE 4 X 10YDS

## (undated) DEVICE — ELECTRODE REM PLYHSV RETURN 9

## (undated) DEVICE — PAD ABD 8X10 STERILE

## (undated) DEVICE — KIT COLLECTION E SWAB REGULAR

## (undated) DEVICE — SPONGE DERMACEA GAUZE 4X4

## (undated) DEVICE — SEE MEDLINE ITEM 156964